# Patient Record
Sex: FEMALE
[De-identification: names, ages, dates, MRNs, and addresses within clinical notes are randomized per-mention and may not be internally consistent; named-entity substitution may affect disease eponyms.]

---

## 2021-03-19 ENCOUNTER — NURSE TRIAGE (OUTPATIENT)
Dept: OTHER | Facility: CLINIC | Age: 51
End: 2021-03-19

## 2021-03-19 NOTE — TELEPHONE ENCOUNTER
Reason for Disposition   Age > 36 and no obvious cause for pain, pain still present even when not moving the arm    Answer Assessment - Initial Assessment Questions  1. ONSET: \"When did the pain start? \"      Started 2.5 hours ago    2. LOCATION: \"Where is the pain located? \"      \"nerve pain\" that started in the hand and has now traveled from the wrist to the forearm and no to the armpit- left side     3. PAIN: \"How bad is the pain? \" (Scale 1-10; or mild, moderate, severe)    - MILD (1-3): doesn't interfere with normal activities    - MODERATE (4-7): interferes with normal activities (e.g., work or school) or awakens from sleep    - SEVERE (8-10): excruciating pain, unable to do any normal activities, unable to hold a cup of water      9/10    4. WORK OR EXERCISE: \"Has there been any recent work or exercise that involved this part of the body? \"      Denies    5. CAUSE: \"What do you think is causing the arm pain? \"      Unsure- possible arthritis     6. OTHER SYMPTOMS: \"Do you have any other symptoms? \" (e.g., neck pain, swelling, rash, fever, numbness, weakness)     Some numbness in the hand last night- tingling in the hand today     7. PREGNANCY: \"Is there any chance you are pregnant? \" \"When was your last menstrual period? \"      Denies    Protocols used: ARM PAIN-ADULT-[place]    Brief description of triage: Arm pain that has worsened in the last 2 hours. See assessment above. Triage indicates for patient to Go to ED now. Pt in agreement with this plan and states she does have a . Care advice provided, patient verbalizes understanding; denies any other questions or concerns; instructed to call back for any new or worsening symptoms. This triage is a result of a call to [address] [place] [address]. Please do not respond to the triage nurse through this encounter. Any subsequent communication should be directly with the patient.

## 2023-11-02 PROBLEM — F41.1 GENERALIZED ANXIETY DISORDER: Status: ACTIVE | Noted: 2023-08-03

## 2023-11-02 PROBLEM — J30.9 ALLERGIC RHINITIS: Status: ACTIVE | Noted: 2023-11-02

## 2023-11-02 PROBLEM — F32.A DEPRESSION: Status: ACTIVE | Noted: 2023-08-03

## 2023-11-02 PROBLEM — M79.606 PAIN IN LOWER LIMB: Status: ACTIVE | Noted: 2023-08-03

## 2023-11-02 PROBLEM — G47.10 HYPERSOMNIA: Status: ACTIVE | Noted: 2023-08-03

## 2023-11-02 PROBLEM — C64.9 RENAL CELL CARCINOMA (MULTI): Status: ACTIVE | Noted: 2023-11-02

## 2023-11-02 PROBLEM — R50.9 FEVER: Status: ACTIVE | Noted: 2023-11-02

## 2023-11-02 PROBLEM — F41.8 MIXED ANXIETY DEPRESSIVE DISORDER: Status: ACTIVE | Noted: 2023-06-15

## 2023-11-02 PROBLEM — F41.9 ANXIETY: Status: ACTIVE | Noted: 2023-11-02

## 2023-11-02 PROBLEM — R13.11 ORAL PHASE DYSPHAGIA: Status: ACTIVE | Noted: 2023-08-03

## 2023-11-02 PROBLEM — R13.10 DYSPHAGIA: Status: ACTIVE | Noted: 2023-08-03

## 2023-11-02 PROBLEM — R73.9 HYPERGLYCEMIA: Status: ACTIVE | Noted: 2023-08-03

## 2023-11-02 PROBLEM — J34.89 POSTERIOR RHINORRHEA: Status: ACTIVE | Noted: 2023-11-02

## 2023-11-02 PROBLEM — G56.02 CARPAL TUNNEL SYNDROME ON LEFT: Status: ACTIVE | Noted: 2023-08-03

## 2023-11-02 PROBLEM — R25.1 TREMOR: Status: ACTIVE | Noted: 2023-06-15

## 2023-11-02 PROBLEM — K21.9 GASTROESOPHAGEAL REFLUX DISEASE: Status: ACTIVE | Noted: 2023-05-23

## 2023-11-02 PROBLEM — Z85.9 HISTORY OF MALIGNANT NEOPLASM: Status: ACTIVE | Noted: 2023-11-02

## 2023-11-02 PROBLEM — Z90.5 HISTORY OF NEPHRECTOMY: Status: ACTIVE | Noted: 2023-11-02

## 2023-11-02 PROBLEM — E07.9 DISORDER OF THYROID: Status: ACTIVE | Noted: 2023-08-03

## 2023-11-02 PROBLEM — D64.9 ANEMIA: Status: ACTIVE | Noted: 2023-05-18

## 2023-11-02 PROBLEM — F31.70 BIPOLAR DISORDER IN FULL REMISSION (MULTI): Status: ACTIVE | Noted: 2023-06-15

## 2023-11-02 PROBLEM — Z86.79 H/O: HYPERTENSION: Status: ACTIVE | Noted: 2023-11-02

## 2023-11-02 PROBLEM — R40.0 DAYTIME SOMNOLENCE: Status: ACTIVE | Noted: 2023-08-03

## 2023-11-02 PROBLEM — G47.30 SLEEP APNEA: Status: ACTIVE | Noted: 2023-11-02

## 2023-11-02 PROBLEM — R05.8 PRODUCTIVE COUGH: Status: ACTIVE | Noted: 2023-11-02

## 2023-11-02 PROBLEM — E55.9 VITAMIN D DEFICIENCY: Status: ACTIVE | Noted: 2023-08-03

## 2023-11-02 PROBLEM — I83.90 VARICOSE VEINS OF LOWER EXTREMITY: Status: ACTIVE | Noted: 2023-08-03

## 2023-11-02 PROBLEM — R50.9 FEVER AND CHILLS: Status: ACTIVE | Noted: 2023-08-03

## 2023-11-02 PROBLEM — Z90.5 H/O RIGHT NEPHRECTOMY: Status: ACTIVE | Noted: 2023-11-02

## 2023-11-02 PROBLEM — J10.1 INFLUENZA B: Status: ACTIVE | Noted: 2023-11-02

## 2023-11-02 PROBLEM — F33.42 RECURRENT MAJOR DEPRESSION IN FULL REMISSION (CMS-HCC): Status: ACTIVE | Noted: 2023-09-14

## 2023-11-02 PROBLEM — R53.83 FATIGUE: Status: ACTIVE | Noted: 2023-06-15

## 2023-11-02 PROBLEM — M21.079 VALGUS DEFORMITY OF FOOT: Status: ACTIVE | Noted: 2023-08-03

## 2023-11-02 PROBLEM — K21.00 GASTROESOPHAGEAL REFLUX DISEASE WITH ESOPHAGITIS: Status: ACTIVE | Noted: 2023-11-02

## 2023-11-02 PROBLEM — K57.90 DIVERTICULAR DISEASE: Status: ACTIVE | Noted: 2023-08-03

## 2023-11-02 PROBLEM — F31.81 BIPOLAR II DISORDER (MULTI): Status: ACTIVE | Noted: 2023-08-03

## 2023-11-02 PROBLEM — D17.9 LIPOMA: Status: ACTIVE | Noted: 2023-08-03

## 2023-11-02 PROBLEM — G21.19: Status: ACTIVE | Noted: 2023-06-15

## 2023-11-02 PROBLEM — G47.33 OBSTRUCTIVE SLEEP APNEA SYNDROME: Status: ACTIVE | Noted: 2023-08-03

## 2023-11-02 PROBLEM — R19.7 DIARRHEA: Status: ACTIVE | Noted: 2023-11-02

## 2023-11-02 PROBLEM — G20.C PARKINSONISM (MULTI): Status: ACTIVE | Noted: 2023-11-02

## 2023-11-02 PROBLEM — E66.01 MORBID OBESITY (MULTI): Status: ACTIVE | Noted: 2023-11-02

## 2023-11-02 PROBLEM — G47.00 INSOMNIA: Status: ACTIVE | Noted: 2023-08-03

## 2023-11-02 PROBLEM — M72.2 PLANTAR FASCIITIS OF LEFT FOOT: Status: ACTIVE | Noted: 2023-08-03

## 2023-11-02 PROBLEM — E66.9 OBESITY (BMI 30-39.9): Status: ACTIVE | Noted: 2023-11-02

## 2023-11-02 PROBLEM — F98.8 ADD (ATTENTION DEFICIT DISORDER): Status: ACTIVE | Noted: 2023-08-03

## 2023-11-02 PROBLEM — J01.90 SINUSITIS, ACUTE: Status: ACTIVE | Noted: 2017-10-24

## 2023-11-02 PROBLEM — C64.1 MALIGNANT NEOPLASM OF RIGHT KIDNEY, EXCEPT RENAL PELVIS (MULTI): Status: ACTIVE | Noted: 2023-05-18

## 2023-11-02 PROBLEM — J39.9 DISORDER OF UPPER RESPIRATORY SYSTEM: Status: ACTIVE | Noted: 2023-11-02

## 2023-11-02 PROBLEM — E05.90 HYPERTHYROIDISM: Status: ACTIVE | Noted: 2023-08-03

## 2023-11-02 RX ORDER — SEMAGLUTIDE 1.34 MG/ML
0.5 INJECTION, SOLUTION SUBCUTANEOUS
COMMUNITY
Start: 2023-02-09

## 2023-11-02 RX ORDER — MULTIVITAMIN
1 TABLET ORAL DAILY
COMMUNITY

## 2023-11-02 RX ORDER — METFORMIN HYDROCHLORIDE 500 MG/1
TABLET ORAL
COMMUNITY
Start: 2022-04-01

## 2023-11-02 RX ORDER — ACETAMINOPHEN AND PHENYLEPHRINE HCL 325; 5 MG/1; MG/1
100 TABLET ORAL
COMMUNITY

## 2023-11-02 RX ORDER — FOLIC ACID 1 MG/1
1 TABLET ORAL DAILY
COMMUNITY
Start: 2023-08-01 | End: 2024-03-04 | Stop reason: WASHOUT

## 2023-11-02 RX ORDER — BUSPIRONE HYDROCHLORIDE 10 MG/1
20 TABLET ORAL 3 TIMES DAILY
COMMUNITY
Start: 2023-04-10

## 2023-11-02 RX ORDER — LOSARTAN POTASSIUM 50 MG/1
1 TABLET ORAL DAILY
COMMUNITY

## 2023-11-02 RX ORDER — ESCITALOPRAM OXALATE 20 MG/1
20 TABLET ORAL
COMMUNITY
End: 2024-03-04 | Stop reason: WASHOUT

## 2023-11-02 RX ORDER — HYDROCHLOROTHIAZIDE 25 MG/1
25 TABLET ORAL 3 TIMES DAILY
COMMUNITY
End: 2024-03-04 | Stop reason: WASHOUT

## 2023-11-02 RX ORDER — ERGOCALCIFEROL 1.25 MG/1
1 CAPSULE ORAL
COMMUNITY
End: 2024-03-04 | Stop reason: WASHOUT

## 2023-11-02 RX ORDER — ZOLPIDEM TARTRATE 1.75 MG/1
1.75 TABLET SUBLINGUAL AS NEEDED
COMMUNITY
End: 2024-03-04 | Stop reason: WASHOUT

## 2023-11-02 RX ORDER — ATORVASTATIN CALCIUM 20 MG/1
20 TABLET, FILM COATED ORAL
COMMUNITY

## 2023-11-02 RX ORDER — VENLAFAXINE HYDROCHLORIDE 150 MG/1
150 CAPSULE, EXTENDED RELEASE ORAL 2 TIMES DAILY
COMMUNITY

## 2023-11-02 RX ORDER — ATOMOXETINE 60 MG/1
1 CAPSULE ORAL DAILY
COMMUNITY
End: 2024-03-04 | Stop reason: DRUGHIGH

## 2023-11-02 RX ORDER — METHIMAZOLE 5 MG/1
5 TABLET ORAL 2 TIMES DAILY
COMMUNITY
End: 2024-03-04 | Stop reason: WASHOUT

## 2023-11-02 RX ORDER — LURASIDONE HYDROCHLORIDE 40 MG/1
1 TABLET, FILM COATED ORAL DAILY
COMMUNITY
End: 2024-03-04 | Stop reason: WASHOUT

## 2023-11-02 RX ORDER — BUPROPION HYDROCHLORIDE 150 MG/1
1 TABLET ORAL DAILY
COMMUNITY
Start: 2019-11-28 | End: 2024-03-04 | Stop reason: WASHOUT

## 2023-11-02 RX ORDER — CLONAZEPAM 0.5 MG/1
1 TABLET ORAL DAILY
COMMUNITY
End: 2024-03-04 | Stop reason: WASHOUT

## 2023-11-02 RX ORDER — OMEPRAZOLE 40 MG/1
1 CAPSULE, DELAYED RELEASE ORAL DAILY
COMMUNITY

## 2023-11-02 RX ORDER — LAMOTRIGINE 200 MG/1
200 TABLET ORAL
COMMUNITY
End: 2024-03-04 | Stop reason: WASHOUT

## 2023-11-03 ENCOUNTER — TELEMEDICINE CLINICAL SUPPORT (OUTPATIENT)
Dept: GENETICS | Facility: CLINIC | Age: 53
End: 2023-11-03
Payer: COMMERCIAL

## 2023-11-03 DIAGNOSIS — C64.9 RENAL CELL CARCINOMA, UNSPECIFIED LATERALITY (MULTI): ICD-10-CM

## 2023-11-03 PROCEDURE — 96040 HC GENETIC COUNSELING, EACH 30 MIN: CPT | Performed by: GENETIC COUNSELOR, MS

## 2023-11-03 PROCEDURE — 96040 PR MEDICAL GENETICS COUNSELING EACH 30 MINUTES: CPT | Performed by: GENETIC COUNSELOR, MS

## 2023-11-03 NOTE — PROGRESS NOTES
Subjective   Patient ID: Park Nicole is a 53 y.o. female who presents for genetic counseling due to a personal history of renal chromophobe carcinoma at  age 51, and a family history of lymphoma, colon cancer, and melanoma.    CANCER MEDICAL HISTORY  Personal history of cancer?  -10/18/21: presented to the ED with right sided flank pain, CT concerning for renal cell carcinoma, 9.4x6.2x6cm, underwent right radical nephrectomy, cholecystectomy, and BSO, pathology showed renal chromophobe carcinoma    PREVIOUS GENETIC TESTING?  -no    CANCER SCREENING HISTORY  Mammograms/MRIS?  - scattered areas of fibroglandular density, category 1 negative  - WNL    Breast biopsies?  -no    Ovarian cancer screening (ultrasound or CA-125)?  -10/18/21 transvaginal ultrasound showed right ovarian dermoid cyst    PAP smear?  - WNL  -all WNL in the past    Colonoscopy?  -18: diverticulosis  -23: 5 mm sessile polyp of the cecum, 6 mm sessile polyp in the transverse colon, both tubular adenomas    Endoscopy?  -18 esophagitis, gastritis    Hysterectomy?  -no    Oophorectomy?  -yes, 2018    Dermatology?  -lipomas on forehead and chest, removed    Other cancer screening?  -4/10/18 thyroid biopsy, two benign follicular nodules, hyperthyroid treated with radioactive iodine    Radiation?  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 The family history was significant for the following:    -Mother with melanoma diagnosed at age 67,  at age 77 in a  fire  -Father with non-Hodgkin lymphoma diagnosed at age 57, now 80  -Father's identical twin with colon cancer diagnosed at age 59, now 80  -Paternal uncle with Hodgkin lymphoma diagnosed at age 63,  at age 73 from his cancer  -Paternal grandfather with colon cancer diagnosed at age 70,  at 73 due to unrelated medical concerns  -Paternal cousin diagnosed with non-Hodgkin lymphoma at age 4, now 14    Consanguinity and Ashkenazi Caodaism ancestry were denied. The patient's maternal side is of French and Armenian descent, and the patient's paternal side is of Bohemian descent. The remainder of the family history was negative for intellectual disability, birth defects, miscarriages, stillbirths, blindness, deafness, kidney disease, heart disease, cancer, muscle disease, and blood disorders.    Assessment/Plan   Genetic Test Ordered: yes    Park Nicole is a 53 year old woman with a personal history of renal chromophobe carcinoma diagnosed at age 51, and a family history of lymphoma, colon cancer, and melanoma. Park meets NCCN criteria for testing of the renal carcinoma genes based on her diagnosis of a chromophobe carcinoma. Testing is medically necessary, as it could impact Park's medical management/treatment, and could estimate her risk to develop another primary cancer. She is interested in testing, which is recommended, and was ordered today via the 82-gene Multi-Cancers panel from [employer]. Our discussion is summarized below.    We reviewed genes, inherited forms of cancer, and the renal carcinoma predisposition genes. We discussed that most cancers are NOT due to an inherited genetic susceptibility. About 5-8% of all kidney cancers are inherited, however, only a few genes that increase the risk of kidney cancer have been identified. Most are associated with specific genetic syndromes with a specific type of kidney cancer. Some of these gene mutations also increase the lifetime risk for other cancers  (such as adrenal, ovarian, uterine, colorectal, sarcomas, brain, leukemia, gastric, thyroid, and prostate). It is likely that other undiscovered genes and background genetic factors contribute to the development of familial kidney cancer along with other non-genetic risk factors. Non-genetic risk factors include: smoking, male gender, -American ethnicity, age, obesity, high blood pressure, overuse of certain medications, exposure to cadmium, chronic kidney disease, and long-term dialysis.      Renal chromophobe cancer specifically has been associated with a genetic condition called Ooud-Rvsz-Ejcy syndrome (BHDS). This is a condition caused by mutations in the FLCN gene. Approximately 19-35% of affected individuals will develop renal cancer, with the most frequently observed type being chromphobe renal cancer. The most common symptoms of this condition include a variety of skin findings such as fibrofolliculomas, acrochordons, angiofibromas, oral papules, and cutaneous collagenomas. Other features include pulmonary cysts and an increased risk for pneumothoraxes.    We also discussed mutations in other genes that can confer an increased risk for renal cancer, along with other cancer types. Hereditary cancer syndrome gene mutations are typically inherited in an autosomal dominant fashion. This means that if an individual has a change in these genes, their siblings and children have a 50% chance of also having that gene change, and a 50% chance of not having that gene change.    Park was counseled about hereditary cancer susceptibility including cancer risks, options for increased screening and/or risk reduction, genetic testing, and the implications for other family members. We discussed genetic testing in detail, including the benefits, risks, and limitations of testing. We also reviewed the various possible test results, including positive, negative, and variant of unknown significance (VUS). We discussed  performing genetic testing in the context of a multi-gene panel test that looks at the FLCN gene, as well as the other genes associated with increasing the risk to develop renal cancer (32 gene Renal/Urinary Tract Cancers panel), or a larger panel that looks at renal cancer genes along with other hereditary cancer genes (84-gene Multi-Cancers panel). Park elected to proceed with [employer]'s 84-gene Multi-Cancers panel.    Park was scheduled for a follow-up consultation on 12-1-23 at 3:00 to discuss her testing results. At that time, we will make recommendations for both Keli and her family members in terms of cancer screening and/or cancer risk reduction options.    PLAN:  Park elected to undergo genetic testing via a panel test that analyzes 84 genes associated with renal and other cancer risks. Consent for testing was obtained verbally.  A buccal collection kit will be sent to her home.  Park will be contacted on 12-01-23 to discuss her results.  We remain available to Park or her family members at [phone] if any questions arise regarding information discussed at today's visit.    Naila Reddy MS [facility] – [place], [place]  Licensed Genetic Counselor  [facility]    Reviewed by Vidhi Cast MD  Clinical   [facility]    Total time spent on day of encounter: 61 minutes

## 2023-12-01 ENCOUNTER — TELEMEDICINE CLINICAL SUPPORT (OUTPATIENT)
Dept: GENETICS | Facility: CLINIC | Age: 53
End: 2023-12-01
Payer: COMMERCIAL

## 2023-12-01 DIAGNOSIS — C64.9 RENAL CELL CARCINOMA, UNSPECIFIED LATERALITY (MULTI): ICD-10-CM

## 2023-12-01 PROCEDURE — 96040 PR MEDICAL GENETICS COUNSELING EACH 30 MINUTES: CPT | Performed by: GENETIC COUNSELOR, MS

## 2023-12-04 NOTE — PROGRESS NOTES
Park Nicole is a 53 year old woman with a personal history of renal cancer and family history of colon cancer. She was initially seen in the [facility] on 11-03-23 for counseling and coordination of genetic testing. Based on Park's history of cancer, the 82 gene panel from [employer] were ordered. We reviewed the results of this testing, and our discussion is summarized below.    Park's genetic testing DID NOT show any known pathogenic (known harmful, cancer-causing) mutations in any of the genes which were examined. 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 The VUS is located at c.[id number] ([id number]) on one copy of her RECQL4 gene.     As discussed, a VUS is a change in the gene from the typical expected result that is not known if it is:  --- (a) harmful and associated with increased cancer risk, or  --- (b) benign and not associated with increased cancer risk.   Over time, as new knowledge is gained, a genetic testing laboratory may be able to reclassify the VUS as either benign or pathogenic. We will let Park know if an amended report is ever issued.    The RECQL4 gene is associated with an autosomal recessive Rothmund-Leos syndrome, RAPADILINO syndrome, and Baller-Gerold syndrome which means that Park needs a change in BOTH copies of the gene in order to be affected with any of those syndromes. Park was only found to have one VUS on one copy of the RECQL4 gene. In the future, if this variant is reclassified as pathogenic (disease-causing), Park would only be a CARRIER of the condition, meaning she would not be affected with these syndromes, but could have a child with the condition if Park's partner also carried a change in one of the RECQL4 genes.    Overall, a genetic cause for Park's personal [and family] history of cancer has not been identified. Negative  genetic testing results can be explained by several possibilities:  --- It is estimated that a small percentage of gene mutations are not identified by current testing technology. Park's negative testing results make a hereditary cancer syndrome less likely, but they do not rule it out completely.  --- There could be other genes that may increase the risk for cancer for which there is no testing available at this time.   --- Park's personal and family history of cancer is not due to an inherited risk factor, and instead is sporadic or due to chance.    Additionally, given her negative results, Park was not found to have a pathogenic (disease-causing) genetic alteration in any of the tested genes that she could have passed down to her children. Given this, genetic testing is not needed for Park's children at this time, unless otherwise indicated based on their father's family history of cancer.     While it is reassuring that no mutations were detected in the tested genes, Park's relatives remain at an increased risk to develop renal cancer based on the family history. Previous studies have estimated an overall two-fold increase in RCC risk in those with a family history of renal cell carcinoma; the general population's risk to develop renal cell carcinoma is 1.6%  (PMID: 85097001). This risk was nearly four-fold when the affected relative was a sibling (PMID: 61832442). Screening such as computed tomography (CT) scans or renal ultrasounds are sometimes used to search for early-stage kidney cancer; these screening methods can be discussed with Park's relatives' primary care physicians.     Park has a paternal uncle and paternal grandfather with colon cancer. Genetic testing did not identify any mutations associated with an increased risk for colon cancer. Per NCCN guidelines, current risk estimates for a family history of colon cancer in only second and third degree relatives are not sufficiently  elevated to recommend increased screening. Some combinations of affected first-, second- and third-degree relatives may increase risk sufficiently to alter screening guidelines.  Individuals with a second degree relative with colon cancer should have colonoscopies beginning at age 45-50 (general population is also recommended to start colonoscopies around this age) and repeat every 10 years or if positive, repeat per colonoscopy findings.    Lastly, it is important for Park to follow all other age-related cancer screenings per her primary care providers' recommendations, such as Pap smears, mammograms, etc.    Plan:  - A copy of Park's genetic test report will be mailed to her.  - Our understanding of genetic contribution to cancer diagnoses is always evolving, so there may be additional testing recommended in the future.  - She can contact us in 3-4 years to determine if there have been any changes since our discussion today and to see if the VUS in the RECQL4 gene has been reclassified.  - If Park has any questions following her appointment today, please call me at [phone].    Naila Reddy MS [facility] – [place]  Licensed Genetic Counselor  [facility]    Reviewed by:  Dr. Vidhi Cast  Clinical   [facility]  [phone]    Total time spent on day of encounter: 31 minutes

## 2024-02-15 ENCOUNTER — APPOINTMENT (OUTPATIENT)
Dept: PRIMARY CARE | Facility: CLINIC | Age: 54
End: 2024-02-15
Payer: COMMERCIAL

## 2024-03-03 PROBLEM — R50.9 FEVER: Status: RESOLVED | Noted: 2023-11-02 | Resolved: 2024-03-03

## 2024-03-03 PROBLEM — E07.9 DISORDER OF THYROID: Status: RESOLVED | Noted: 2023-08-03 | Resolved: 2024-03-03

## 2024-03-03 PROBLEM — F41.9 ANXIETY: Status: RESOLVED | Noted: 2023-11-02 | Resolved: 2024-03-03

## 2024-03-03 PROBLEM — K21.00 GASTROESOPHAGEAL REFLUX DISEASE WITH ESOPHAGITIS: Status: RESOLVED | Noted: 2023-11-02 | Resolved: 2024-03-03

## 2024-03-03 PROBLEM — Z85.9 HISTORY OF MALIGNANT NEOPLASM: Status: RESOLVED | Noted: 2023-11-02 | Resolved: 2024-03-03

## 2024-03-03 PROBLEM — G47.33 OBSTRUCTIVE SLEEP APNEA SYNDROME: Status: RESOLVED | Noted: 2023-08-03 | Resolved: 2024-03-03

## 2024-03-03 PROBLEM — R13.11 ORAL PHASE DYSPHAGIA: Status: RESOLVED | Noted: 2023-08-03 | Resolved: 2024-03-03

## 2024-03-03 PROBLEM — Z90.5 HISTORY OF NEPHRECTOMY: Status: RESOLVED | Noted: 2023-11-02 | Resolved: 2024-03-03

## 2024-03-03 PROBLEM — D17.9 LIPOMA: Status: RESOLVED | Noted: 2023-08-03 | Resolved: 2024-03-03

## 2024-03-03 PROBLEM — F31.70 BIPOLAR DISORDER IN FULL REMISSION (MULTI): Status: RESOLVED | Noted: 2023-06-15 | Resolved: 2024-03-03

## 2024-03-03 PROBLEM — J10.1 INFLUENZA B: Status: RESOLVED | Noted: 2023-11-02 | Resolved: 2024-03-03

## 2024-03-03 PROBLEM — G47.10 HYPERSOMNIA: Status: RESOLVED | Noted: 2023-08-03 | Resolved: 2024-03-03

## 2024-03-03 PROBLEM — C64.9 RENAL CELL CARCINOMA (MULTI): Status: RESOLVED | Noted: 2023-11-02 | Resolved: 2024-03-03

## 2024-03-03 PROBLEM — J01.90 SINUSITIS, ACUTE: Status: RESOLVED | Noted: 2017-10-24 | Resolved: 2024-03-03

## 2024-03-03 PROBLEM — F41.8 MIXED ANXIETY DEPRESSIVE DISORDER: Status: RESOLVED | Noted: 2023-06-15 | Resolved: 2024-03-03

## 2024-03-03 PROBLEM — J34.89 POSTERIOR RHINORRHEA: Status: RESOLVED | Noted: 2023-11-02 | Resolved: 2024-03-03

## 2024-03-03 PROBLEM — J39.9 DISORDER OF UPPER RESPIRATORY SYSTEM: Status: RESOLVED | Noted: 2023-11-02 | Resolved: 2024-03-03

## 2024-03-03 PROBLEM — R50.9 FEVER AND CHILLS: Status: RESOLVED | Noted: 2023-08-03 | Resolved: 2024-03-03

## 2024-03-04 ENCOUNTER — LAB (OUTPATIENT)
Dept: LAB | Facility: LAB | Age: 54
End: 2024-03-04
Payer: COMMERCIAL

## 2024-03-04 ENCOUNTER — OFFICE VISIT (OUTPATIENT)
Dept: PRIMARY CARE | Facility: CLINIC | Age: 54
End: 2024-03-04
Payer: COMMERCIAL

## 2024-03-04 VITALS
OXYGEN SATURATION: 97 % | TEMPERATURE: 97.8 F | HEIGHT: 66 IN | BODY MASS INDEX: 38.25 KG/M2 | SYSTOLIC BLOOD PRESSURE: 120 MMHG | DIASTOLIC BLOOD PRESSURE: 80 MMHG | HEART RATE: 81 BPM | WEIGHT: 238 LBS

## 2024-03-04 DIAGNOSIS — K14.6 TONGUE BURNING SENSATION: ICD-10-CM

## 2024-03-04 DIAGNOSIS — R25.1 TREMOR: Primary | ICD-10-CM

## 2024-03-04 DIAGNOSIS — R79.0 LOW FERRITIN: Primary | ICD-10-CM

## 2024-03-04 DIAGNOSIS — J01.10 ACUTE NON-RECURRENT FRONTAL SINUSITIS: ICD-10-CM

## 2024-03-04 PROBLEM — G47.00 INSOMNIA: Status: RESOLVED | Noted: 2023-08-03 | Resolved: 2024-03-04

## 2024-03-04 PROBLEM — R40.0 DAYTIME SOMNOLENCE: Status: RESOLVED | Noted: 2023-08-03 | Resolved: 2024-03-04

## 2024-03-04 PROBLEM — F33.42 RECURRENT MAJOR DEPRESSION IN FULL REMISSION (CMS-HCC): Status: RESOLVED | Noted: 2023-09-14 | Resolved: 2024-03-04

## 2024-03-04 PROBLEM — Z86.79 H/O: HYPERTENSION: Status: RESOLVED | Noted: 2023-11-02 | Resolved: 2024-03-04

## 2024-03-04 PROBLEM — K21.9 GASTROESOPHAGEAL REFLUX DISEASE: Status: RESOLVED | Noted: 2023-05-23 | Resolved: 2024-03-04

## 2024-03-04 PROBLEM — R05.8 PRODUCTIVE COUGH: Status: RESOLVED | Noted: 2023-11-02 | Resolved: 2024-03-04

## 2024-03-04 PROBLEM — G20.C PARKINSONISM (MULTI): Status: RESOLVED | Noted: 2023-11-02 | Resolved: 2024-03-04

## 2024-03-04 PROBLEM — M72.2 PLANTAR FASCIITIS OF LEFT FOOT: Status: RESOLVED | Noted: 2023-08-03 | Resolved: 2024-03-04

## 2024-03-04 PROBLEM — F31.81 BIPOLAR II DISORDER (MULTI): Status: RESOLVED | Noted: 2023-08-03 | Resolved: 2024-03-04

## 2024-03-04 LAB
ERYTHROCYTE [DISTWIDTH] IN BLOOD BY AUTOMATED COUNT: 14 % (ref 11.5–14.5)
FERRITIN SERPL-MCNC: 17 NG/ML (ref 8–150)
FOLATE SERPL-MCNC: >24 NG/ML
HCT VFR BLD AUTO: 41.7 % (ref 36–46)
HGB BLD-MCNC: 13.3 G/DL (ref 12–16)
IRON SATN MFR SERPL: 10 % (ref 25–45)
IRON SERPL-MCNC: 41 UG/DL (ref 35–150)
MCH RBC QN AUTO: 29.2 PG (ref 26–34)
MCHC RBC AUTO-ENTMCNC: 31.9 G/DL (ref 32–36)
MCV RBC AUTO: 92 FL (ref 80–100)
NRBC BLD-RTO: 0 /100 WBCS (ref 0–0)
PLATELET # BLD AUTO: 288 X10*3/UL (ref 150–450)
RBC # BLD AUTO: 4.55 X10*6/UL (ref 4–5.2)
TIBC SERPL-MCNC: 410 UG/DL (ref 240–445)
UIBC SERPL-MCNC: 369 UG/DL (ref 110–370)
VIT B12 SERPL-MCNC: 771 PG/ML (ref 211–911)
WBC # BLD AUTO: 7 X10*3/UL (ref 4.4–11.3)

## 2024-03-04 PROCEDURE — 82746 ASSAY OF FOLIC ACID SERUM: CPT

## 2024-03-04 PROCEDURE — 82728 ASSAY OF FERRITIN: CPT

## 2024-03-04 PROCEDURE — 82607 VITAMIN B-12: CPT

## 2024-03-04 PROCEDURE — 1036F TOBACCO NON-USER: CPT | Performed by: INTERNAL MEDICINE

## 2024-03-04 PROCEDURE — 85027 COMPLETE CBC AUTOMATED: CPT

## 2024-03-04 PROCEDURE — 83550 IRON BINDING TEST: CPT

## 2024-03-04 PROCEDURE — 36415 COLL VENOUS BLD VENIPUNCTURE: CPT

## 2024-03-04 PROCEDURE — 99214 OFFICE O/P EST MOD 30 MIN: CPT | Performed by: INTERNAL MEDICINE

## 2024-03-04 PROCEDURE — 83540 ASSAY OF IRON: CPT

## 2024-03-04 RX ORDER — HYDROXYZINE HYDROCHLORIDE 25 MG/1
25 TABLET, FILM COATED ORAL 2 TIMES DAILY PRN
COMMUNITY
Start: 2024-02-24

## 2024-03-04 RX ORDER — FLUTICASONE PROPIONATE 50 MCG
1 SPRAY, SUSPENSION (ML) NASAL DAILY
Qty: 16 G | Refills: 11 | Status: SHIPPED | OUTPATIENT
Start: 2024-03-04 | End: 2025-03-04

## 2024-03-04 RX ORDER — AMANTADINE HYDROCHLORIDE 100 MG/1
100 TABLET ORAL 2 TIMES DAILY
COMMUNITY
Start: 2024-02-21

## 2024-03-04 RX ORDER — GABAPENTIN 400 MG/1
400 CAPSULE ORAL DAILY
COMMUNITY
Start: 2024-02-25

## 2024-03-04 RX ORDER — ATOMOXETINE 40 MG/1
40 CAPSULE ORAL DAILY
COMMUNITY
Start: 2024-03-01

## 2024-03-04 RX ORDER — AMOXICILLIN 500 MG/1
TABLET, FILM COATED ORAL
Qty: 14 TABLET | Refills: 0 | Status: SHIPPED | OUTPATIENT
Start: 2024-03-04

## 2024-03-04 RX ORDER — PROPRANOLOL HYDROCHLORIDE 10 MG/1
10 TABLET ORAL 2 TIMES DAILY
COMMUNITY
Start: 2024-02-05

## 2024-03-04 ASSESSMENT — PATIENT HEALTH QUESTIONNAIRE - PHQ9
SUM OF ALL RESPONSES TO PHQ9 QUESTIONS 1 & 2: 0
1. LITTLE INTEREST OR PLEASURE IN DOING THINGS: NOT AT ALL
2. FEELING DOWN, DEPRESSED OR HOPELESS: NOT AT ALL

## 2024-03-04 ASSESSMENT — PAIN SCALES - GENERAL: PAINLEVEL: 6

## 2024-03-04 NOTE — PROGRESS NOTES
"   Chief Complaint   Patient presents with    tongue discomfort     Pt here for pain to tongue.  States \"There are also little grooves in my tongue.\"  Onset 2 months.       53 year old woman  Last seen 02/2022  Since then saw partners  Most recent visit 05/2023 Dr Bustamante.   Also since last visit had genetic testing.     Congestion end of December. Since then had tongue pain  Better but still present.  Noticed bumps in the last 1-2 weeks.  Salty food irritate it. Previously was any food  No thrush. Did not notice any ulcers  No GERD.  Trying to walk every other day 30 minutes on treadmill    Past medical history  Euthyroid, previously hyperthyroid. Treated with radioactive iodine November 2018 Dr. Bautista  Diverticulosis, colonoscopy August 2018 Dr. Parkinson  EGD August 2018 esophagitis, gastritis  Sleep apnea CPAP 8 per sleep study. Does not recall settings. Dr. Reyes  Vitamin D deficiency  Depression anxiety nurse practitioner [facility] behavioral [facility] counseling [place]  Varicose veins  Renal carcinoma, right status post nephrectomy. Dr. Rea October 2021, Dr Ocampo  Laparoscopic cholecystectomy October 2021 Dr Vargas  BSO 10/2021 Dr ROULA Tate.     Social history. Works as a teacher. Non-smoker. . 2 sons     Exam  Blood pressure 120/80, pulse 81, temperature 36.6 °C (97.8 °F), height 1.676 m (5' 6\"), weight 108 kg (238 lb), SpO2 97 %.  Body mass index is 38.41 kg/m².  +frontal sinus tenderness  Ears: TM intact. No erythema  Throat: no thrush, no ulcers. +ridges anterior tongue  Lymph nodes: no cervical/clavicular adenopathy  CV: RRR S1 S2 normal. No murmur  Lungs: CTA without wrr. Breath sounds symmetric.   Neuro: speech intact        1/2022 tsh, cmp, lipid, t4, D, cbc.      November 2021 TSH, T4, CBC, BMP  TSH 0.85  Sodium 139 potassium 4.1 creatinine 1.2 GFR 46  White blood cell count 5.5 hemoglobin low 9.5 hematocrit 28.5 platelet 295     ASSESSMENT AND PLAN:   1. Tremor        Sees specialist for " this and is on medication    2. Tongue burning sensation  If labs negative advised to see ent  - Ferritin; Future  - Iron and TIBC; Future  - Vitamin B12; Future  - Folate; Future  - CBC; Future    3. Acute non-recurrent frontal sinusitis  - amoxicillin (Amoxil) 500 mg tablet; 1 po bid  Dispense: 14 tablet; Refill: 0  - fluticasone (Flonase) 50 mcg/actuation nasal spray; Administer 1 spray into each nostril once daily. Shake gently. Before first use, prime pump. After use, clean tip and replace cap.  Dispense: 16 g; Refill: 11       Mammogram 12/2021 negative.  Pt reports had this done by hem/onc and is up to date.   DEXA per Dr. Bautista  Pap 2/2022 due 2027  Colonoscopy August 2018 Dr Parkinson repeat 5-10 years.      Current Outpatient Medications on File Prior to Visit   Medication Sig Dispense Refill    amantadine (Symmetrel) 100 mg tablet 1 tablet (100 mg) 2 times a day.      atomoxetine (Strattera) 40 mg capsule Take 1 capsule (40 mg) by mouth once daily.      atorvastatin (Lipitor) 20 mg tablet Take 1 tablet (20 mg) by mouth once daily.      biotin 10,000 mcg capsule Take 10 capsules (100 mg) by mouth once daily.      busPIRone (Buspar) 10 mg tablet Take 2 tablets (20 mg) by mouth 3 times a day.      gabapentin (Neurontin) 400 mg capsule Take 1 capsule (400 mg) by mouth once daily.      hydrOXYzine HCL (Atarax) 25 mg tablet Take 1 tablet (25 mg) by mouth 2 times a day as needed for anxiety.      losartan (Cozaar) 50 mg tablet Take 1 tablet (50 mg) by mouth once daily.      metFORMIN (Glucophage) 500 mg tablet Take by mouth. TAKE ONE TABLET IN THE AM AND THREE TABLETS IN THE PM      multivitamin (Daily Multi-Vitamin) tablet Take 1 tablet by mouth once daily.      omeprazole (PriLOSEC) 40 mg DR capsule Take 1 capsule (40 mg) by mouth once daily.      propranolol (Inderal) 10 mg tablet Take 1 tablet (10 mg) by mouth 2 times a day.      semaglutide (Ozempic) 0.25 mg or 0.5 mg(2 mg/1.5 mL) pen injector Inject 0.5 mg  under the skin 1 (one) time per week.      venlafaxine XR (Effexor-XR) 150 mg 24 hr capsule Take 1 capsule (150 mg) by mouth twice a day.      [DISCONTINUED] atomoxetine (Strattera) 60 mg capsule Take 1 capsule (60 mg) by mouth once daily.      [DISCONTINUED] buPROPion XL (Wellbutrin XL) 150 mg 24 hr tablet Take 1 tablet (150 mg) by mouth once daily.      [DISCONTINUED] clonazePAM (KlonoPIN) 0.5 mg tablet Take 1 tablet (0.5 mg) by mouth once daily.      [DISCONTINUED] ergocalciferol (Vitamin D-2) 1.25 MG (71256 UT) capsule Take 1 capsule (1,250 mcg) by mouth 1 (one) time per week.      [DISCONTINUED] escitalopram (Lexapro) 20 mg tablet Take 1 tablet (20 mg) by mouth once daily.      [DISCONTINUED] folic acid (Folvite) 1 mg tablet Take 1 tablet (1 mg) by mouth once daily.      [DISCONTINUED] hydroCHLOROthiazide (HYDRODiuril) 25 mg tablet Take 1 tablet (25 mg) by mouth 3 times a day.      [DISCONTINUED] lamoTRIgine (LaMICtal) 200 mg tablet Take 1 tablet (200 mg) by mouth once daily.      [DISCONTINUED] lurasidone (Latuda) 40 mg tablet Take 1 tablet (40 mg) by mouth once daily.      [DISCONTINUED] methIMAzole (Tapazole) 5 mg tablet Take 1 tablet (5 mg) by mouth twice a day.      [DISCONTINUED] vortioxetine (Trintellix) 20 mg tablet tablet Take 1 tablet (20 mg) by mouth once daily.      [DISCONTINUED] zolpidem 1.75 mg tablet, sublingual Place 1.75 mg under the tongue if needed.       No current facility-administered medications on file prior to visit.

## 2024-05-10 ENCOUNTER — TELEPHONE (OUTPATIENT)
Dept: PRIMARY CARE | Facility: CLINIC | Age: 54
End: 2024-05-10
Payer: COMMERCIAL

## 2024-05-10 NOTE — TELEPHONE ENCOUNTER
"Pt left message \"The past two nights I have had panic attacks with lingering symptoms the day after on an off.  I cannot get in to see my psychologist until Wednesday and they suggest that I go to the ER or call you.\"  This writer called pt back to discuss lingering symptoms.  Pt became tearful and states \"I ended up going to the ER because I don't want to deal with this again tonight.  I was having on and off chest pain, chest tightness and sweating.  I didn't know what to do, no one was helping me.\"  Encouraged pt that it was best that she went to the ER as she did.  Pt at [facility] ER at time of this note.  "

## 2024-05-10 NOTE — TELEPHONE ENCOUNTER
"Spoke with pt.  States \"I am feeling a little bit better.  The ER told me to take Klonopin 0.5 MG twice a day for the next three days and they got me an appointment with my psychiatrist for next Wednesday.\"  "

## 2024-07-25 LAB
NON-UH HIE A/G RATIO: 1.1
NON-UH HIE ALB: 3.7 G/DL (ref 3.4–5)
NON-UH HIE ALK PHOS: 115 UNIT/L (ref 45–117)
NON-UH HIE BILIRUBIN, TOTAL: 0.8 MG/DL (ref 0.3–1.2)
NON-UH HIE BUN/CREAT RATIO: 15.8
NON-UH HIE BUN: 19 MG/DL (ref 9–23)
NON-UH HIE CALCIUM, IONIZED: 1.33 MMOL/L (ref 1.12–1.32)
NON-UH HIE CALCIUM: 10.4 MG/DL (ref 8.7–10.4)
NON-UH HIE CALCULATED OSMOLALITY: 283 MOSM/KG (ref 275–295)
NON-UH HIE CHLORIDE: 104 MMOL/L (ref 98–107)
NON-UH HIE CO2, VENOUS: 31 MMOL/L (ref 20–31)
NON-UH HIE CREATININE, URINE MG/DL: 69.8 MG/DL
NON-UH HIE CREATININE: 1.2 MG/DL (ref 0.5–0.8)
NON-UH HIE GFR AA: 57
NON-UH HIE GLOBULIN: 3.3 G/DL
NON-UH HIE GLOMERULAR FILTRATION RATE: 47 ML/MIN/1.73M?
NON-UH HIE GLUCOSE: 95 MG/DL (ref 74–106)
NON-UH HIE GOT: 20 UNIT/L (ref 15–37)
NON-UH HIE GPT: 24 UNIT/L (ref 10–49)
NON-UH HIE HGB A1C: 5.3 %
NON-UH HIE I-PTH: 115 PG/ML (ref 18.4–80.1)
NON-UH HIE K: 4.4 MMOL/L (ref 3.5–5.1)
NON-UH HIE MICROALBUMIN, URINE MG/L: <3 MG/L
NON-UH HIE MICROALBUMIN/CREATININE RATIO: <4 MG MALB/GM CREAT (ref 0–30)
NON-UH HIE NA: 141 MMOL/L (ref 135–145)
NON-UH HIE TOTAL PROTEIN: 7 G/DL (ref 5.7–8.2)
NON-UH HIE TSH: 1.49 UIU/ML (ref 0.55–4.78)
NON-UH HIE VIT D 25: 43 NG/ML

## 2024-08-01 DIAGNOSIS — Z12.31 ENCOUNTER FOR SCREENING MAMMOGRAM FOR BREAST CANCER: ICD-10-CM

## 2024-08-01 NOTE — PROGRESS NOTES
She has this done per her cancer doctor at indicated she was up to date. Can we obtain report so up to date in system and meets criteria for metric

## 2024-08-08 ENCOUNTER — APPOINTMENT (OUTPATIENT)
Dept: RADIOLOGY | Facility: CLINIC | Age: 54
End: 2024-08-08
Payer: COMMERCIAL

## 2024-08-26 ENCOUNTER — APPOINTMENT (OUTPATIENT)
Dept: RADIOLOGY | Facility: CLINIC | Age: 54
End: 2024-08-26
Payer: COMMERCIAL

## 2024-09-23 ENCOUNTER — APPOINTMENT (OUTPATIENT)
Dept: PRIMARY CARE | Facility: CLINIC | Age: 54
End: 2024-09-23
Payer: COMMERCIAL

## 2024-09-23 VITALS
SYSTOLIC BLOOD PRESSURE: 118 MMHG | HEIGHT: 66 IN | HEART RATE: 68 BPM | WEIGHT: 229.4 LBS | DIASTOLIC BLOOD PRESSURE: 70 MMHG | BODY MASS INDEX: 36.87 KG/M2 | OXYGEN SATURATION: 96 % | TEMPERATURE: 97.8 F

## 2024-09-23 DIAGNOSIS — R10.32 LEFT LOWER QUADRANT ABDOMINAL PAIN: ICD-10-CM

## 2024-09-23 DIAGNOSIS — Z90.5 H/O RIGHT NEPHRECTOMY: ICD-10-CM

## 2024-09-23 DIAGNOSIS — C64.1 MALIGNANT NEOPLASM OF RIGHT KIDNEY, EXCEPT RENAL PELVIS: Primary | ICD-10-CM

## 2024-09-23 PROCEDURE — 1036F TOBACCO NON-USER: CPT | Performed by: INTERNAL MEDICINE

## 2024-09-23 PROCEDURE — 99214 OFFICE O/P EST MOD 30 MIN: CPT | Performed by: INTERNAL MEDICINE

## 2024-09-23 PROCEDURE — 3008F BODY MASS INDEX DOCD: CPT | Performed by: INTERNAL MEDICINE

## 2024-09-23 RX ORDER — SEMAGLUTIDE 1.34 MG/ML
1 INJECTION, SOLUTION SUBCUTANEOUS WEEKLY
COMMUNITY
Start: 2024-06-08

## 2024-09-23 RX ORDER — FERROUS SULFATE 325(65) MG
325 TABLET ORAL 3 TIMES WEEKLY
COMMUNITY

## 2024-09-23 RX ORDER — PROPRANOLOL HYDROCHLORIDE 20 MG/1
20 TABLET ORAL 2 TIMES DAILY
COMMUNITY
Start: 2024-05-17

## 2024-09-23 RX ORDER — ATOMOXETINE 60 MG/1
60 CAPSULE ORAL DAILY
COMMUNITY
Start: 2024-09-15

## 2024-09-23 ASSESSMENT — PAIN SCALES - GENERAL: PAINLEVEL: 0-NO PAIN

## 2024-09-23 ASSESSMENT — PATIENT HEALTH QUESTIONNAIRE - PHQ9
2. FEELING DOWN, DEPRESSED OR HOPELESS: SEVERAL DAYS
SUM OF ALL RESPONSES TO PHQ9 QUESTIONS 1 & 2: 2
10. IF YOU CHECKED OFF ANY PROBLEMS, HOW DIFFICULT HAVE THESE PROBLEMS MADE IT FOR YOU TO DO YOUR WORK, TAKE CARE OF THINGS AT HOME, OR GET ALONG WITH OTHER PEOPLE: SOMEWHAT DIFFICULT
1. LITTLE INTEREST OR PLEASURE IN DOING THINGS: SEVERAL DAYS

## 2024-09-23 NOTE — PROGRESS NOTES
"Chief Complaint   Patient presents with    Pain     Pt here for c/o pain to left side of torso, states \"The pain started last Thursday and I had it on Friday. Too.  The pain felt like the pain I had on my right side when I was diagnosed with cancer.\"     54 year old woman  Last seen 003/2024. Left sided abdominal discomfort. Urination and bowel movements are ok.   Had renal cancer right side. This pain reminded her of what she felt prior to diagnosis of right renal cancer.        BM ok. No dysuria or hematuria.   Dr Ocampo's office recommended trying heat.         Undergoing work up by Dr Bautista for hyperparathyroidism.       Past medical history  Euthyroid, previously hyperthyroid. Treated with radioactive iodine November 2018 Dr. Bautista  Diverticulosis, colonoscopy August 2018 Dr. Parkinson  EGD August 2018 esophagitis, gastritis  Sleep apnea CPAP 8 per sleep study. Does not recall settings. Dr. Reyes  Vitamin D deficiency  Depression anxiety nurse practitioner [facility] [place]  Varicose veins  Renal carcinoma, right status post nephrectomy. Dr. Rea October 2021, Dr Ocampo  Laparoscopic cholecystectomy October 2021 Dr Vargas  BSO 10/2021 Dr ROULA Tate.     Social history. Works as a teacher. Non-smoker. . 2 sons     Exam  Blood pressure 118/70, pulse 68, temperature 36.6 °C (97.8 °F), height 1.676 m (5' 6\"), weight 104 kg (229 lb 6.4 oz), SpO2 96%.  Body mass index is 37.03 kg/m².  Vital reviewed  CV: RRR S1 S2 normal. No murmur.   Lungs: CTA without wrr. Breath sounds symmetric  Abdomen: normoactive. Soft. No mass.   Extremities: no pretibial edema  Neuro: speech intact.        07/2024 MA, PTH, CMP, D, TSH, A1c, calcium     1/2022 tsh, cmp, lipid, t4, D, cbc.      November 2021 TSH, T4, CBC, BMP  TSH 0.85  Sodium 139 potassium 4.1 creatinine 1.2 GFR 46  White blood cell count 5.5 hemoglobin low 9.5 hematocrit 28.5 platelet 295     ASSESSMENT AND PLAN:  1. Malignant " neoplasm of right kidney, except renal pelvis (Multi)  - CT abdomen pelvis w IV contrast; Future  - Creatinine, Serum; Future    2. H/O right nephrectomy  - CT abdomen pelvis w IV contrast; Future  - Creatinine, Serum; Future    3. Left lower quadrant abdominal pain  - CT abdomen pelvis w IV contrast; Future  - Creatinine, Serum; Future    Discussed case with Dr Ocampo on 9/20/2024.  Patient had CT chest/abd/pelvis May 2024 that was negative. He indicated this type of cancer typically does not return to the same location but if returns may go to the lung. Also would not expect this to be source of pain given recent negative imaging. However, given history of cancer does need repeat imaging of ct a/p now.      Mammogram  06/2023   DEXA per Dr. Bautista  Pap 2/2022 due 2027  Colonoscopy August 2018 Dr Parkinson repeat 5-10 years.      Current Outpatient Medications on File Prior to Visit   Medication Sig Dispense Refill    amantadine (Symmetrel) 100 mg tablet 1 tablet (100 mg) 2 times a day.      atomoxetine (Strattera) 60 mg capsule Take 1 capsule (60 mg) by mouth once daily.      atorvastatin (Lipitor) 20 mg tablet Take 1 tablet (20 mg) by mouth once daily.      biotin 10,000 mcg capsule Take 1 capsule (10 mg) by mouth once daily.      busPIRone (Buspar) 10 mg tablet Take 2 tablets (20 mg) by mouth 3 times a day.      ferrous sulfate, 325 mg ferrous sulfate, tablet Take 1 tablet (325 mg) by mouth 3 times a week.      fluticasone (Flonase) 50 mcg/actuation nasal spray Administer 1 spray into each nostril once daily. Shake gently. Before first use, prime pump. After use, clean tip and replace cap. 16 g 11    gabapentin (Neurontin) 400 mg capsule Take 1 capsule (400 mg) by mouth once daily.      hydrOXYzine HCL (Atarax) 25 mg tablet Take 1 tablet (25 mg) by mouth 2 times a day as needed for anxiety.      losartan (Cozaar) 50 mg tablet Take 1 tablet (50 mg) by mouth once daily.      metFORMIN (Glucophage) 500 mg tablet Take  by mouth. TAKE ONE TABLET IN THE AM AND THREE TABLETS IN THE PM      multivitamin (Daily Multi-Vitamin) tablet Take 1 tablet by mouth once daily.      omeprazole (PriLOSEC) 40 mg DR capsule Take 2 capsules (80 mg) by mouth once daily.      Ozempic 1 mg/dose (4 mg/3 mL) pen injector Inject 1 mg under the skin 1 (one) time per week.      propranolol (Inderal) 20 mg tablet Take 1 tablet (20 mg) by mouth 2 times a day.      venlafaxine XR (Effexor-XR) 150 mg 24 hr capsule Take 1 capsule (150 mg) by mouth twice a day.      [DISCONTINUED] amoxicillin (Amoxil) 500 mg tablet 1 po bid (Patient not taking: Reported on 9/23/2024) 14 tablet 0    [DISCONTINUED] atomoxetine (Strattera) 40 mg capsule Take 1 capsule (40 mg) by mouth once daily.      [DISCONTINUED] propranolol (Inderal) 10 mg tablet Take 1 tablet (10 mg) by mouth 2 times a day.      [DISCONTINUED] semaglutide (Ozempic) 0.25 mg or 0.5 mg(2 mg/1.5 mL) pen injector Inject 0.5 mg under the skin 1 (one) time per week.       No current facility-administered medications on file prior to visit.

## 2024-10-08 ENCOUNTER — HOSPITAL ENCOUNTER (OUTPATIENT)
Dept: RADIOLOGY | Facility: EXTERNAL LOCATION | Age: 54
Discharge: HOME | End: 2024-10-08

## 2024-10-14 ENCOUNTER — HOSPITAL ENCOUNTER (OUTPATIENT)
Dept: RADIOLOGY | Facility: HOSPITAL | Age: 54
Discharge: HOME | End: 2024-10-14
Payer: COMMERCIAL

## 2024-10-14 DIAGNOSIS — C64.1 MALIGNANT NEOPLASM OF RIGHT KIDNEY, EXCEPT RENAL PELVIS: ICD-10-CM

## 2024-10-14 DIAGNOSIS — Z90.5 H/O RIGHT NEPHRECTOMY: ICD-10-CM

## 2024-10-14 DIAGNOSIS — R10.32 LEFT LOWER QUADRANT ABDOMINAL PAIN: ICD-10-CM

## 2024-10-14 LAB
CREAT SERPL-MCNC: 1.1 MG/DL (ref 0.6–1.3)
GFR SERPL CREATININE-BSD FRML MDRD: 60 ML/MIN/1.73M*2

## 2024-10-14 PROCEDURE — 74177 CT ABD & PELVIS W/CONTRAST: CPT

## 2024-10-14 PROCEDURE — 2550000001 HC RX 255 CONTRASTS: Performed by: INTERNAL MEDICINE

## 2024-10-14 PROCEDURE — 74177 CT ABD & PELVIS W/CONTRAST: CPT | Performed by: RADIOLOGY

## 2024-10-14 PROCEDURE — 82565 ASSAY OF CREATININE: CPT

## 2024-10-16 ENCOUNTER — TELEPHONE (OUTPATIENT)
Dept: PRIMARY CARE | Facility: CLINIC | Age: 54
End: 2024-10-16
Payer: COMMERCIAL

## 2024-10-16 DIAGNOSIS — R93.5 ABNORMAL CT OF THE ABDOMEN: Primary | ICD-10-CM

## 2024-10-16 NOTE — TELEPHONE ENCOUNTER
Called pt. Ct borderline hepatomegaly and area of fat. Lfts normal July. Obtain repeat now and check hepatitis panel. She will call Dr Parkinson and set up appt.

## 2024-11-19 ENCOUNTER — HOSPITAL ENCOUNTER (OUTPATIENT)
Dept: RADIOLOGY | Facility: CLINIC | Age: 54
Discharge: HOME | End: 2024-11-19
Payer: COMMERCIAL

## 2024-11-19 ENCOUNTER — APPOINTMENT (OUTPATIENT)
Dept: RADIOLOGY | Facility: CLINIC | Age: 54
End: 2024-11-19
Payer: COMMERCIAL

## 2024-11-19 ENCOUNTER — LAB (OUTPATIENT)
Dept: LAB | Facility: LAB | Age: 54
End: 2024-11-19
Payer: COMMERCIAL

## 2024-11-19 DIAGNOSIS — Z90.5 H/O RIGHT NEPHRECTOMY: ICD-10-CM

## 2024-11-19 DIAGNOSIS — R10.32 LEFT LOWER QUADRANT ABDOMINAL PAIN: ICD-10-CM

## 2024-11-19 DIAGNOSIS — C64.1 MALIGNANT NEOPLASM OF RIGHT KIDNEY, EXCEPT RENAL PELVIS: ICD-10-CM

## 2024-11-19 DIAGNOSIS — R93.5 ABNORMAL CT OF THE ABDOMEN: ICD-10-CM

## 2024-11-19 DIAGNOSIS — R79.0 LOW FERRITIN: ICD-10-CM

## 2024-11-19 DIAGNOSIS — Z12.31 ENCOUNTER FOR SCREENING MAMMOGRAM FOR BREAST CANCER: ICD-10-CM

## 2024-11-19 LAB
ALBUMIN SERPL BCP-MCNC: 4 G/DL (ref 3.4–5)
ALP SERPL-CCNC: 101 U/L (ref 33–110)
ALT SERPL W P-5'-P-CCNC: 15 U/L (ref 7–45)
AST SERPL W P-5'-P-CCNC: 16 U/L (ref 9–39)
BILIRUB DIRECT SERPL-MCNC: 0.1 MG/DL (ref 0–0.3)
BILIRUB SERPL-MCNC: 0.4 MG/DL (ref 0–1.2)
CREAT SERPL-MCNC: 1.14 MG/DL (ref 0.5–1.05)
EGFRCR SERPLBLD CKD-EPI 2021: 58 ML/MIN/1.73M*2
FERRITIN SERPL-MCNC: 21 NG/ML (ref 8–150)
HAV IGM SER QL: NONREACTIVE
HBV CORE IGM SER QL: NONREACTIVE
HBV SURFACE AG SERPL QL IA: NONREACTIVE
HCV AB SER QL: NONREACTIVE
IRON SATN MFR SERPL: 14 % (ref 25–45)
IRON SERPL-MCNC: 53 UG/DL (ref 35–150)
PROT SERPL-MCNC: 6.8 G/DL (ref 6.4–8.2)
TIBC SERPL-MCNC: 378 UG/DL (ref 240–445)
UIBC SERPL-MCNC: 325 UG/DL (ref 110–370)

## 2024-11-19 PROCEDURE — 80076 HEPATIC FUNCTION PANEL: CPT

## 2024-11-19 PROCEDURE — 80074 ACUTE HEPATITIS PANEL: CPT

## 2024-11-19 PROCEDURE — 77067 SCR MAMMO BI INCL CAD: CPT | Performed by: RADIOLOGY

## 2024-11-19 PROCEDURE — 82565 ASSAY OF CREATININE: CPT

## 2024-11-19 PROCEDURE — 77063 BREAST TOMOSYNTHESIS BI: CPT | Performed by: RADIOLOGY

## 2024-11-19 PROCEDURE — 77067 SCR MAMMO BI INCL CAD: CPT

## 2024-11-19 PROCEDURE — 83540 ASSAY OF IRON: CPT

## 2024-11-19 PROCEDURE — 82728 ASSAY OF FERRITIN: CPT

## 2024-11-19 PROCEDURE — 83550 IRON BINDING TEST: CPT

## 2024-11-19 PROCEDURE — 36415 COLL VENOUS BLD VENIPUNCTURE: CPT

## 2024-11-20 ENCOUNTER — TELEPHONE (OUTPATIENT)
Dept: PRIMARY CARE | Facility: CLINIC | Age: 54
End: 2024-11-20
Payer: COMMERCIAL

## 2024-11-20 DIAGNOSIS — R92.8 ABNORMAL MAMMOGRAM: ICD-10-CM

## 2024-11-20 DIAGNOSIS — Z90.5 H/O RIGHT NEPHRECTOMY: Primary | ICD-10-CM

## 2024-11-20 RX ORDER — TRAZODONE HYDROCHLORIDE 50 MG/1
50 TABLET ORAL NIGHTLY
COMMUNITY
Start: 2024-10-22 | End: 2025-04-20

## 2024-11-20 RX ORDER — BUPROPION HYDROCHLORIDE 150 MG/1
1 TABLET ORAL
COMMUNITY
Start: 2024-11-15

## 2024-11-20 NOTE — TELEPHONE ENCOUNTER
Called pt. She will call Olivia and set up additional testing right breast.   Discussed labs-she wonders if anything else can be done to preserve renal function. Will have her see nephrologist to determine if any other recommendations.

## 2024-11-25 ENCOUNTER — APPOINTMENT (OUTPATIENT)
Dept: NEUROLOGY | Facility: CLINIC | Age: 54
End: 2024-11-25
Payer: COMMERCIAL

## 2024-11-25 VITALS
DIASTOLIC BLOOD PRESSURE: 83 MMHG | WEIGHT: 225 LBS | BODY MASS INDEX: 36.16 KG/M2 | HEART RATE: 72 BPM | HEIGHT: 66 IN | SYSTOLIC BLOOD PRESSURE: 131 MMHG

## 2024-11-25 DIAGNOSIS — R25.1 PHYSIOLOGICAL TREMOR: ICD-10-CM

## 2024-11-25 DIAGNOSIS — G31.84 MILD COGNITIVE IMPAIRMENT: Primary | ICD-10-CM

## 2024-11-25 PROCEDURE — 3008F BODY MASS INDEX DOCD: CPT | Performed by: PSYCHIATRY & NEUROLOGY

## 2024-11-25 PROCEDURE — 99204 OFFICE O/P NEW MOD 45 MIN: CPT | Performed by: PSYCHIATRY & NEUROLOGY

## 2024-11-25 PROCEDURE — 99214 OFFICE O/P EST MOD 30 MIN: CPT | Mod: GC | Performed by: PSYCHIATRY & NEUROLOGY

## 2024-11-25 PROCEDURE — 1036F TOBACCO NON-USER: CPT | Performed by: PSYCHIATRY & NEUROLOGY

## 2024-11-25 ASSESSMENT — ENCOUNTER SYMPTOMS
LOSS OF SENSATION IN FEET: 0
OCCASIONAL FEELINGS OF UNSTEADINESS: 0
DEPRESSION: 1

## 2024-11-25 ASSESSMENT — UNIFIED PARKINSONS DISEASE RATING SCALE (UPDRS)
SPONTANEITY_OF_MOVEMENT: 0
RIGIDITY_LLE: 0
POSTURAL_STABILITY: 0
TOETAPPING_RIGHT: 0
AMPLITUDE_LUE: 1
AMPLITUDE_LIP_JAW: 0
TOTAL_SCORE: 7
LEG_AGILITY_LEFT: 0
FACIAL_EXPRESSION: 0
PRONATION_SUPINATION_LEFT: 0
PRONATION_SUPINATION_RIGHT: 0
POSTURE: 0
POSTURAL_TREMOR_LEFTHAND: 1
RIGIDITY_LUE: 0
LEG_AGILITY_RIGHT: 0
LEVODOPA: NO
GAIT: 0
RIGIDITY_RLE: 0
FINGER_TAPPING_RIGHT: 0
CHAIR_RISING_SCALE: 0
KINETIC_TREMOR_LEFTHAND: 0
TOETAPPING_LEFT: 1
PARKINSONS_MEDS: NO
KINETIC_TREMOR_RIGHTHAND: 0
AMPLITUDE_RUE: 1
FREEZING_GAIT: 0
SPEECH: 0
CONSTANCY_TREMOR_ATREST: 1
DYSKINESIAS_PRESENT: NO
RIGIDITY_NECK: 0
HANDMOVEMENTS_RIGHT: 0
POSTURAL_TREMOR_RIGHTHAND: 1
FINGER_TAPPING_LEFT: 1
RIGIDITY_RUE: 0
AMPLITUDE_LLE: 0
AMPLITUDE_RLE: 0

## 2024-11-25 ASSESSMENT — PATIENT HEALTH QUESTIONNAIRE - PHQ9
SUM OF ALL RESPONSES TO PHQ9 QUESTIONS 1 & 2: 2
2. FEELING DOWN, DEPRESSED OR HOPELESS: SEVERAL DAYS
SUM OF ALL RESPONSES TO PHQ9 QUESTIONS 1 AND 2: 2
2. FEELING DOWN, DEPRESSED OR HOPELESS: SEVERAL DAYS
1. LITTLE INTEREST OR PLEASURE IN DOING THINGS: SEVERAL DAYS
1. LITTLE INTEREST OR PLEASURE IN DOING THINGS: SEVERAL DAYS

## 2024-11-25 NOTE — PATIENT INSTRUCTIONS
Ms. Nicole,    You were seen for tremors and memory difficulties. Your exam reveals mild tremors, not concerning for Parkinson's. Given that propranolol and biotin have not improved tremors, we will discontinue these medications. Your exam also revealed mild cognitive impairment. We recommend further bloodwork and an MRI scan of your brain. We also referred you for neuropsychological testing. Please contact us at [phone] if you have any questions or concerns. Please return to clinic in 6 months.

## 2024-11-25 NOTE — LETTER
"November 25, 2024     Britta Siddiqui MD  [id number]  [facility] [address]    Patient: Park Nicole   YOB: 1970   Date of Visit: 11/25/2024       Dear Dr. Britta Siddiqui MD:    Thank you for referring Park Nicole to me for evaluation. Below are my notes for this consultation.  If you have questions, please do not hesitate to call me. I look forward to following your patient along with you.       Sincerely,     Staci Tavares MD      CC: No Recipients  ______________________________________________________________________________________    Subjective   Park Nicole is a 54 y.o. year old female with PMHx RCC s/p R nephrectomy (2021), hyperthyroidism, ROSALBA, PLMD, anxiety, depression who presents for tremor evaluation.    Per chart review, tremors started in 2022: around the same time, patient started on Wellbutrin for worsening anxiety. At the time, Wellbutrin was working well for anxiety but patient noticed \"new onset tremors.\" Most recently followed with [facility]rology NP at [facility] in 3/2024: patient was improving on amantadine. No recent TSH available for review; Endocrinology following (last seen 9/2024).    Per patient, tremors started during adolescence, right more than left. Tremors continue to be confined to BUE but have worsened (more pronounced) over the past 10 years. Most noticeable with tasks (e.g. photography), but able to write, button clothes and use utensils. Has not interfered with social activities. Patient denied benefits with propranolol, biotin; patient does not remember effects of amantadine (she stopped taking the medication a few months prior). Tremors have slightly worsened over past 6 months. Patient denied freezing but described \"wobbly legs\" without falls. Denied anosmia, RBD, orthostasis, AVH. Reported constipation in past 4 months, that improved with hydration and stool softener.     Stopped Strattera in 10/2024. Restarted Wellbutrin in 9/2024 (patient denied taking " "it for \"several years\"). Started trazodone 2-3 months ago. Has been taking buspirone and venlafaxine at current dose for several years.    Patient also reported cognitive slowing and memory difficulties (e.g. trouble recalling names of students) in past few months. Sleeps 7 hours per night, usually feels well-rested upon waking. Consistent CPAP use. Denied current mood dysregulation but endorsed significant emotional stressors a few months ago. Patient drinks one cup of coffee in AM; sometimes has coffee or caffeinated soda in PM.    Denied family history of tremors or neurodegenerative conditions.    Working as teacher. Never smoker. Rarely uses alcohol; reported minimal improvement of tremors with alcohol. Denied illicit substance use.     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)   • Malignant neoplasm of right kidney, except renal pelvis   • Sleep apnea   • Tremor   • Valgus deformity of foot   • Varicose veins of lower extremity   • Vitamin D deficiency     Past Medical History:   Diagnosis Date   • Personal history of other diseases of the circulatory system     History of cardiac disorder   • Personal history of other diseases of the circulatory system     History of hypertension   • Personal history of other diseases of the musculoskeletal system and connective tissue     History of arthritis   • Personal history of other diseases of the respiratory system     History of asthma     Past Surgical History:   Procedure Laterality Date   • OTHER SURGICAL HISTORY  11/08/2021    Nose surgery   • OTHER SURGICAL HISTORY  " 2021     section   • OTHER SURGICAL HISTORY  2021    Nephrectomy   • OTHER SURGICAL HISTORY  2021    Cholecystectomy laparoscopic   • OTHER SURGICAL HISTORY  2021    Salpingo-oophorectomy bilateral     Social History     Tobacco Use   • Smoking status: Never   • Smokeless tobacco: Never   Substance Use Topics   • Alcohol use: Not on file     family history includes COPD in her mother; Cancer in an other family member; Colon cancer in her paternal grandfather; Diabetes in her mother; Fibromyalgia in her mother; Hypertension in her father and another family member; Lymphoma in her father; Skin cancer in her mother; cardiac disorder in her mother.    Current Outpatient Medications:   •  buPROPion XL (Wellbutrin XL) 150 mg 24 hr tablet, Take 1 tablet (150 mg) by mouth early in the morning.., Disp: , Rfl:   •  traZODone (Desyrel) 50 mg tablet, Take 1 tablet (50 mg) by mouth once daily at bedtime., Disp: , Rfl:   •  amantadine (Symmetrel) 100 mg tablet, 1 tablet (100 mg) 2 times a day., Disp: , Rfl:   •  atomoxetine (Strattera) 60 mg capsule, Take 1 capsule (60 mg) by mouth once daily., Disp: , Rfl:   •  atorvastatin (Lipitor) 20 mg tablet, Take 1 tablet (20 mg) by mouth once daily., Disp: , Rfl:   •  biotin 10,000 mcg capsule, Take 1 capsule (10 mg) by mouth once daily., Disp: , Rfl:   •  busPIRone (Buspar) 10 mg tablet, Take 2 tablets (20 mg) by mouth 3 times a day., Disp: , Rfl:   •  ferrous sulfate, 325 mg ferrous sulfate, tablet, Take 1 tablet (325 mg) by mouth 3 times a week., Disp: , Rfl:   •  fluticasone (Flonase) 50 mcg/actuation nasal spray, Administer 1 spray into each nostril once daily. Shake gently. Before first use, prime pump. After use, clean tip and replace cap., Disp: 16 g, Rfl: 11  •  gabapentin (Neurontin) 400 mg capsule, Take 1 capsule (400 mg) by mouth once daily., Disp: , Rfl:   •  hydrOXYzine HCL (Atarax) 25 mg tablet, Take 1 tablet (25 mg) by mouth 2 times a day as  needed for anxiety., Disp: , Rfl:   •  losartan (Cozaar) 50 mg tablet, Take 1 tablet (50 mg) by mouth once daily., Disp: , Rfl:   •  metFORMIN (Glucophage) 500 mg tablet, Take by mouth. TAKE ONE TABLET IN THE AM AND THREE TABLETS IN THE PM, Disp: , Rfl:   •  multivitamin (Daily Multi-Vitamin) tablet, Take 1 tablet by mouth once daily., Disp: , Rfl:   •  omeprazole (PriLOSEC) 40 mg DR capsule, Take 2 capsules (80 mg) by mouth once daily., Disp: , Rfl:   •  Ozempic 1 mg/dose (4 mg/3 mL) pen injector, Inject 1 mg under the skin 1 (one) time per week., Disp: , Rfl:   •  propranolol (Inderal) 20 mg tablet, Take 1 tablet (20 mg) by mouth 2 times a day., Disp: , Rfl:   •  venlafaxine XR (Effexor-XR) 150 mg 24 hr capsule, Take 1 capsule (150 mg) by mouth twice a day., Disp: , Rfl:   No Known Allergies    Objective   Vitals:    11/25/24 1309   BP: 131/83   Pulse: 72      Physical Exam  Neurological Exam:  MENTAL STATUS:  General appearance: alert, mildly anxious, NAD  Orientation: person, place, time    MOCA 23/30 on 11/25/2024. 5/5 visual spacial. 3/3 naming. 5/6 attention. 2/3 language. 2/2 abstraction. 0/5 recall. 6/6 orientation.     CRANIAL NERVES:  - II:  Visual fields intact to confrontation bilaterally  - III, IV, VI: EOMI to pursuit without nystagmus  - V: V1-V3 sensation intact bilaterally  - VII: Face muscles symmetric with smile and eye closure  - VIII: Intact to voice  - IX, X: Palate elevated symmetrically bilaterally, no hoarseness  - XI: 5/5 strength on shoulder shrugging bilaterally  - XII: Tongue midline without atrophy or fasciculation    MOTOR: Tone and bulk normal in all extremities; very mild, fine tremors in bilateral hands. Archimedes spirals and handwriting intact.    STRENGTH: R L  Deltoid  5 5  Biceps  5 5  Triceps  5 5    5 5    Hip flexion 5 5  Quadriceps 5 5  Hamstrings 5 5    REFLEXES:  R  L  Biceps   2+ 2+  Triceps   2+ 2+  Patellar   2+ 2+    COORDINATION: Intact on finger to nose bl,  intact on heel to shin bl    SENSORY: grossly intact to light touch in bl UE and LE    GAIT: Normal standard gait     MDS UPDRS 1st Score:   Motor Examination  Is the patient on medication for treating the symptoms of Parkinson's Disease?: No  Is the patient on Levodopa?: No  Speech: 0  Facial Expression: 0  Rigidty Neck: 0  Rigidty RUE: 0  Rigidity - LUE: 0  Rigidity RLE: 0  Rigidity LLE: 0  Finger Tapping Right Hand: 0  Finger Tapping Left Hand: 1  Hand Movements- Right Hand: 0  Hand Movements- Left Hand: 0  Pronatiaon-Supination Movments - Right Hand: 0  Pronatiaon-Supination Movments Left Hand: 0  Toe Tapping Right Foot: 0  Toe Tapping - Left Foot: 1  Leg Agility - Right Le  Leg Agility - Left le  Arising from Chair: 0  Gait: 0  Freezing of Gait: 0  Postural Stability: 0  Posture: 0  Global Spontanteity of Movment ( Body Bradykinesia): 0  Postural Tremor - Right Hand: 1  Postural Tremor - Left hand: 1  Kinetic Tremor - Right hand: 0  Kinetic Tremor - Left hand: 0  Rest Tremor Amplitude - RUE: 1  Rest Tremor Amplitude - LUE: 1  Rest Tremor Amplitude - RLE: 0  Rest Tremor Amplitude - LLE: 0  Rest Tremor Amplitude - Lip/Jaw: 0  Constancy of Rest Tremor: 1  MDS UPDRS Total Score: 7  Were dyskinesias (chorea or dystonia) present during examination?: No      Assessment/Plan   Park Nicole is a 54 y.o. year old female with PMHx RCC s/p R nephrectomy (), hyperthyroidism, ROSALBA, PLMD, anxiety, depression who presents for tremor evaluation. Mild, fine tremor on exam suggestive of enhanced physiological tremor vs mild ET. As tremors do not significantly interfere with daily activity, reasonable to defer treatment. MoCA reflective of mild cognitive impairment: differential includes mood disorder vs metabolic encephalopathy vs primary neurodegenerative process. Further work-up is reasonable.    - discontinue propranolol and biotin i/s/o reported minimal benefits for tremor  - MRI brain wo contrast to evaluate for  structural etiology for MCI  - obtain serum B12, TSH to evaluate for metabolic etiology for MCI  - referral to Neuropsychology for comprehensive cognitive testing  - referral to PT for reported gait instability  - RTC 6 months    Patient seen and discussed with attending physician, Dr. Tavares.    Melinda Bryant  PGY-4 Neurology     I saw and evaluated the patient. I personally obtained the key and critical portions of the history and physical exam or was physically present for key and critical portions performed by the resident/fellow. I reviewed the resident/fellow's documentation and discussed the patient with the resident/fellow. I agree with the resident/fellow's medical decision making as documented in the note. Very mild kinetic tremor, more concerning is cognitive changes in a 54 year old. Will work up.     Staci Tavares MD     For the Evaluation and Management of this patient, the level of Medical Decision Making for this visit was determined based on the following:    The level of COMPLEXITY AND NUMBER OF PROBLEMS ADDRESSED was [MODERATE] as determined by:     MODERATE:  undiagnosed new problem with uncertain prognosis.        The AMOUNT/COMPLEXITY OF DATA TO REVIEW (reviewed, ordered or call for) was [ MODERATE as determined by:  lMODERATE (need one of the three):  my review of prior external notes and testing results as well as ordering a new unique test.        The level of RISK OF COMPLICATIONS was [ LOW] as determined by:      LOW:  A low risk of morbidity from additional diagnostic testing or treatment.      Thus, the level of medical decision making (based on the lower of the two highest elements) was determined to be [MODERATE, Therefore the appropriate E/M code for this encounter is [ 21726/

## 2024-11-25 NOTE — PROGRESS NOTES
"Subjective   Park Nicole is a 54 y.o. year old female with PMHx RCC s/p R nephrectomy (2021), hyperthyroidism, ROSALBA, PLMD, anxiety, depression who presents for tremor evaluation.    Per chart review, tremors started in 2022: around the same time, patient started on Wellbutrin for worsening anxiety. At the time, Wellbutrin was working well for anxiety but patient noticed \"new onset tremors.\" Most recently followed with Neurology NP at [facility] in 3/2024: patient was improving on amantadine. No recent TSH available for review; Endocrinology following (last seen 9/2024).    Per patient, tremors started during adolescence, right more than left. Tremors continue to be confined to BUE but have worsened (more pronounced) over the past 10 years. Most noticeable with tasks (e.g. photography), but able to write, button clothes and use utensils. Has not interfered with social activities. Patient denied benefits with propranolol, biotin; patient does not remember effects of amantadine (she stopped taking the medication a few months prior). Tremors have slightly worsened over past 6 months. Patient denied freezing but described \"wobbly legs\" without falls. Denied anosmia, RBD, orthostasis, AVH. Reported constipation in past 4 months, that improved with hydration and stool softener.     Stopped Strattera in 10/2024. Restarted Wellbutrin in 9/2024 (patient denied taking it for \"several years\"). Started trazodone 2-3 months ago. Has been taking buspirone and venlafaxine at current dose for several years.    Patient also reported cognitive slowing and memory difficulties (e.g. trouble recalling names of students) in past few months. Sleeps 7 hours per night, usually feels well-rested upon waking. Consistent CPAP use. Denied current mood dysregulation but endorsed significant emotional stressors a few months ago. Patient drinks one cup of coffee in AM; sometimes has coffee or caffeinated soda in PM.    Denied family history of tremors " or neurodegenerative conditions.    Working as teacher. Never smoker. Rarely uses alcohol; reported minimal improvement of tremors with alcohol. Denied illicit substance use.     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)    Malignant neoplasm of right kidney, except renal pelvis    Sleep apnea    Tremor    Valgus deformity of foot    Varicose veins of lower extremity    Vitamin D deficiency     Past Medical History:   Diagnosis Date    Personal history of other diseases of the circulatory system     History of cardiac disorder    Personal history of other diseases of the circulatory system     History of hypertension    Personal history of other diseases of the musculoskeletal system and connective tissue     History of arthritis    Personal history of other diseases of the respiratory system     History of asthma     Past Surgical History:   Procedure Laterality Date    OTHER SURGICAL HISTORY  2021    Nose surgery    OTHER SURGICAL HISTORY  2021     section    OTHER SURGICAL HISTORY  2021    Nephrectomy    OTHER SURGICAL HISTORY  2021    Cholecystectomy laparoscopic    OTHER SURGICAL HISTORY  2021    Salpingo-oophorectomy bilateral     Social History     Tobacco Use    Smoking status: Never    Smokeless tobacco: Never   Substance Use Topics    Alcohol use: Not on file     family history includes COPD in her mother; Cancer in an other family member; Colon cancer in her paternal grandfather; Diabetes in her mother; Fibromyalgia in her mother; Hypertension in her father and another family member; Lymphoma  in her father; Skin cancer in her mother; cardiac disorder in her mother.    Current Outpatient Medications:     buPROPion XL (Wellbutrin XL) 150 mg 24 hr tablet, Take 1 tablet (150 mg) by mouth early in the morning.., Disp: , Rfl:     traZODone (Desyrel) 50 mg tablet, Take 1 tablet (50 mg) by mouth once daily at bedtime., Disp: , Rfl:     amantadine (Symmetrel) 100 mg tablet, 1 tablet (100 mg) 2 times a day., Disp: , Rfl:     atomoxetine (Strattera) 60 mg capsule, Take 1 capsule (60 mg) by mouth once daily., Disp: , Rfl:     atorvastatin (Lipitor) 20 mg tablet, Take 1 tablet (20 mg) by mouth once daily., Disp: , Rfl:     biotin 10,000 mcg capsule, Take 1 capsule (10 mg) by mouth once daily., Disp: , Rfl:     busPIRone (Buspar) 10 mg tablet, Take 2 tablets (20 mg) by mouth 3 times a day., Disp: , Rfl:     ferrous sulfate, 325 mg ferrous sulfate, tablet, Take 1 tablet (325 mg) by mouth 3 times a week., Disp: , Rfl:     fluticasone (Flonase) 50 mcg/actuation nasal spray, Administer 1 spray into each nostril once daily. Shake gently. Before first use, prime pump. After use, clean tip and replace cap., Disp: 16 g, Rfl: 11    gabapentin (Neurontin) 400 mg capsule, Take 1 capsule (400 mg) by mouth once daily., Disp: , Rfl:     hydrOXYzine HCL (Atarax) 25 mg tablet, Take 1 tablet (25 mg) by mouth 2 times a day as needed for anxiety., Disp: , Rfl:     losartan (Cozaar) 50 mg tablet, Take 1 tablet (50 mg) by mouth once daily., Disp: , Rfl:     metFORMIN (Glucophage) 500 mg tablet, Take by mouth. TAKE ONE TABLET IN THE AM AND THREE TABLETS IN THE PM, Disp: , Rfl:     multivitamin (Daily Multi-Vitamin) tablet, Take 1 tablet by mouth once daily., Disp: , Rfl:     omeprazole (PriLOSEC) 40 mg DR capsule, Take 2 capsules (80 mg) by mouth once daily., Disp: , Rfl:     Ozempic 1 mg/dose (4 mg/3 mL) pen injector, Inject 1 mg under the skin 1 (one) time per week., Disp: , Rfl:     propranolol (Inderal) 20 mg tablet, Take 1 tablet (20  mg) by mouth 2 times a day., Disp: , Rfl:     venlafaxine XR (Effexor-XR) 150 mg 24 hr capsule, Take 1 capsule (150 mg) by mouth twice a day., Disp: , Rfl:   No Known Allergies    Objective   Vitals:    11/25/24 1309   BP: 131/83   Pulse: 72      Physical Exam  Neurological Exam:  MENTAL STATUS:  General appearance: alert, mildly anxious, NAD  Orientation: person, place, time    MOCA 23/30 on 11/25/2024. 5/5 visual spacial. 3/3 naming. 5/6 attention. 2/3 language. 2/2 abstraction. 0/5 recall. 6/6 orientation.     CRANIAL NERVES:  - II:  Visual fields intact to confrontation bilaterally  - III, IV, VI: EOMI to pursuit without nystagmus  - V: V1-V3 sensation intact bilaterally  - VII: Face muscles symmetric with smile and eye closure  - VIII: Intact to voice  - IX, X: Palate elevated symmetrically bilaterally, no hoarseness  - XI: 5/5 strength on shoulder shrugging bilaterally  - XII: Tongue midline without atrophy or fasciculation    MOTOR: Tone and bulk normal in all extremities; very mild, fine tremors in bilateral hands. Archimedes spirals and handwriting intact.    STRENGTH: R L  Deltoid  5 5  Biceps  5 5  Triceps  5 5    5 5    Hip flexion 5 5  Quadriceps 5 5  Hamstrings 5 5    REFLEXES:  R  L  Biceps   2+ 2+  Triceps   2+ 2+  Patellar   2+ 2+    COORDINATION: Intact on finger to nose bl, intact on heel to shin bl    SENSORY: grossly intact to light touch in bl UE and LE    GAIT: Normal standard gait     MDS UPDRS 1st Score:   Motor Examination  Is the patient on medication for treating the symptoms of Parkinson's Disease?: No  Is the patient on Levodopa?: No  Speech: 0  Facial Expression: 0  Rigidty Neck: 0  Rigidty RUE: 0  Rigidity - LUE: 0  Rigidity RLE: 0  Rigidity LLE: 0  Finger Tapping Right Hand: 0  Finger Tapping Left Hand: 1  Hand Movements- Right Hand: 0  Hand Movements- Left Hand: 0  Pronatiaon-Supination Movments - Right Hand: 0  Pronatiaon-Supination Movments Left Hand: 0  Toe Tapping Right  Foot: 0  Toe Tapping - Left Foot: 1  Leg Agility - Right Le  Leg Agility - Left le  Arising from Chair: 0  Gait: 0  Freezing of Gait: 0  Postural Stability: 0  Posture: 0  Global Spontanteity of Movment ( Body Bradykinesia): 0  Postural Tremor - Right Hand: 1  Postural Tremor - Left hand: 1  Kinetic Tremor - Right hand: 0  Kinetic Tremor - Left hand: 0  Rest Tremor Amplitude - RUE: 1  Rest Tremor Amplitude - LUE: 1  Rest Tremor Amplitude - RLE: 0  Rest Tremor Amplitude - LLE: 0  Rest Tremor Amplitude - Lip/Jaw: 0  Constancy of Rest Tremor: 1  MDS UPDRS Total Score: 7  Were dyskinesias (chorea or dystonia) present during examination?: No      Assessment/Plan   aPrk Nicole is a 54 y.o. year old female with PMHx RCC s/p R nephrectomy (), hyperthyroidism, ROSALBA, PLMD, anxiety, depression who presents for tremor evaluation. Mild, fine tremor on exam suggestive of enhanced physiological tremor vs mild ET. As tremors do not significantly interfere with daily activity, reasonable to defer treatment. MoCA reflective of mild cognitive impairment: differential includes mood disorder vs metabolic encephalopathy vs primary neurodegenerative process. Further work-up is reasonable.    - discontinue propranolol and biotin i/s/o reported minimal benefits for tremor  - MRI brain wo contrast to evaluate for structural etiology for MCI  - obtain serum B12, TSH to evaluate for metabolic etiology for MCI  - referral to Neuropsychology for comprehensive cognitive testing  - referral to PT for reported gait instability  - RTC 6 months    Patient seen and discussed with attending physician, Dr. Tavares.    Melinda Bryant  PGY-4 Neurology     I saw and evaluated the patient. I personally obtained the key and critical portions of the history and physical exam or was physically present for key and critical portions performed by the resident/fellow. I reviewed the resident/fellow's documentation and discussed the patient with the  resident/fellow. I agree with the resident/fellow's medical decision making as documented in the note. Very mild kinetic tremor, more concerning is cognitive changes in a 54 year old. Will work up.     Staci Tavares MD     For the Evaluation and Management of this patient, the level of Medical Decision Making for this visit was determined based on the following:    The level of COMPLEXITY AND NUMBER OF PROBLEMS ADDRESSED was [MODERATE] as determined by:     MODERATE:  undiagnosed new problem with uncertain prognosis.        The AMOUNT/COMPLEXITY OF DATA TO REVIEW (reviewed, ordered or call for) was [ MODERATE as determined by:  lMODERATE (need one of the three):  my review of prior external notes and testing results as well as ordering a new unique test.        The level of RISK OF COMPLICATIONS was [ LOW] as determined by:      LOW:  A low risk of morbidity from additional diagnostic testing or treatment.      Thus, the level of medical decision making (based on the lower of the two highest elements) was determined to be [MODERATE, Therefore the appropriate E/M code for this encounter is [ 79843/

## 2024-11-29 ENCOUNTER — APPOINTMENT (OUTPATIENT)
Dept: NEPHROLOGY | Facility: CLINIC | Age: 54
End: 2024-11-29
Payer: COMMERCIAL

## 2024-12-05 ENCOUNTER — HOSPITAL ENCOUNTER (OUTPATIENT)
Dept: RADIOLOGY | Facility: CLINIC | Age: 54
Discharge: HOME | End: 2024-12-05
Payer: COMMERCIAL

## 2024-12-05 DIAGNOSIS — R92.8 ABNORMAL MAMMOGRAM: ICD-10-CM

## 2024-12-05 DIAGNOSIS — R92.8 ABNORMAL MAMMOGRAM: Primary | ICD-10-CM

## 2024-12-05 PROCEDURE — 76982 USE 1ST TARGET LESION: CPT | Mod: 50,RT

## 2024-12-05 PROCEDURE — 76642 ULTRASOUND BREAST LIMITED: CPT | Mod: RT

## 2024-12-05 PROCEDURE — 77061 BREAST TOMOSYNTHESIS UNI: CPT | Mod: RT

## 2024-12-16 ENCOUNTER — HOSPITAL ENCOUNTER (OUTPATIENT)
Dept: RADIOLOGY | Facility: CLINIC | Age: 54
Discharge: HOME | End: 2024-12-16
Payer: COMMERCIAL

## 2024-12-16 DIAGNOSIS — G31.84 MILD COGNITIVE IMPAIRMENT: ICD-10-CM

## 2024-12-16 PROCEDURE — 70551 MRI BRAIN STEM W/O DYE: CPT

## 2024-12-18 NOTE — PROGRESS NOTES
Left message on pts phone re MRI brain and to call office back if any questions   If calls back can relay:  Essentially her brain scan is normal - but possibly some sebaceous cysts in scalp.   This can be followed by dermatology if needed or if they are painful.

## 2024-12-23 ENCOUNTER — HOSPITAL ENCOUNTER (OUTPATIENT)
Dept: RADIOLOGY | Facility: CLINIC | Age: 54
Discharge: HOME | End: 2024-12-23
Payer: COMMERCIAL

## 2024-12-23 ENCOUNTER — OFFICE VISIT (OUTPATIENT)
Dept: ORTHOPEDIC SURGERY | Facility: CLINIC | Age: 54
End: 2024-12-23
Payer: COMMERCIAL

## 2024-12-23 DIAGNOSIS — M25.562 ACUTE PAIN OF LEFT KNEE: Primary | ICD-10-CM

## 2024-12-23 DIAGNOSIS — M25.562 ACUTE PAIN OF LEFT KNEE: ICD-10-CM

## 2024-12-23 PROCEDURE — 99213 OFFICE O/P EST LOW 20 MIN: CPT | Performed by: ORTHOPAEDIC SURGERY

## 2024-12-23 PROCEDURE — 99203 OFFICE O/P NEW LOW 30 MIN: CPT | Performed by: ORTHOPAEDIC SURGERY

## 2024-12-23 PROCEDURE — 73564 X-RAY EXAM KNEE 4 OR MORE: CPT | Mod: LT

## 2024-12-23 PROCEDURE — 1036F TOBACCO NON-USER: CPT | Performed by: ORTHOPAEDIC SURGERY

## 2024-12-23 NOTE — PROGRESS NOTES
Chief Complaint   Chief Complaint   Patient presents with    Left Knee - Pain         HPI:      Park Nicole is a pleasant 54 y.o. year-old female who is seen today for left knee injury she twisted and contused her left knee 2 weeks ago no prior problems hurts the medial aspect the joint hurts when she squats there is no real locking or catching at this point she has had no treatment    Review of Systems all other body systems have been reviewed and are negative for complaint.    There were no vitals filed for this visit.    Past Medical History:   Diagnosis Date    Personal history of other diseases of the circulatory system     History of cardiac disorder    Personal history of other diseases of the circulatory system     History of hypertension    Personal history of other diseases of the musculoskeletal system and connective tissue     History of arthritis    Personal history of other diseases of the respiratory system     History of asthma     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 Sig Documenting Provider Last Dose Status   atorvastatin (Lipitor) 20 mg tablet [id number] No Take 1 tablet (20 mg) by mouth once daily. Historical Provider, MD Taking Active   buPROPion XL (Wellbutrin XL) 150 mg 24 hr tablet [id number]  Take 1 tablet (150 mg) by  mouth early in the morning.. Kamran Feliciano MD  Active   busPIRone (Buspar) 10 mg tablet [id number] No Take 2 tablets (20 mg) by mouth 3 times a day. Kamran Feliciano MD Taking Active   ferrous sulfate, 325 mg ferrous sulfate, tablet [id number] No Take 1 tablet (325 mg) by mouth 3 times a week.   Patient not taking: Reported on 11/25/2024    Kamran Feliciano MD Taking Active   fluticasone (Flonase) 50 mcg/actuation nasal spray [id number] No Administer 1 spray into each nostril once daily. Shake gently. Before first use, prime pump. After use, clean tip and replace cap.   Patient not taking: Reported on 11/25/2024    Britta Siddiqui MD Taking Active   gabapentin (Neurontin) 400 mg capsule [id number] No Take 1 capsule (400 mg) by mouth once daily. Kamran Feliciano MD Taking Active   hydrOXYzine HCL (Atarax) 25 mg tablet [id number] No Take 1 tablet (25 mg) by mouth 2 times a day as needed for anxiety. Kamran Feliciano MD Taking Active   losartan (Cozaar) 50 mg tablet [id number] No Take 1 tablet (50 mg) by mouth once daily. Kamran Feliciano MD Taking Active   metFORMIN (Glucophage) 500 mg tablet [id number] No Take by mouth. TAKE ONE TABLET IN THE AM AND THREE TABLETS IN THE PM Kamran Feliciano MD Taking Active   multivitamin (Daily Multi-Vitamin) tablet [id number] No Take 1 tablet by mouth once daily. Kamran Feliciano MD Taking Active   omeprazole (PriLOSEC) 40 mg DR capsule [id number] No Take 2 capsules (80 mg) by mouth once daily. Kamran Feliciano MD Taking Active   Ozempic 1 mg/dose (4 mg/3 mL) pen injector [id number] No Inject 1 mg under the skin 1 (one) time per week. Kamran Feliciano MD Taking Active   traZODone (Desyrel) 50 mg tablet [id number]  Take 1 tablet (50 mg) by mouth once daily at bedtime. Kamran Feliciano MD  Active   venlafaxine XR (Effexor-XR) 150 mg 24 hr capsule [id number] No Take 1 capsule (150 mg) by mouth twice a day. Kamran Feliciano MD Taking Active          "           No Known Allergies    Social History     Socioeconomic History    Marital status:      Spouse name: Not on file    Number of children: Not on file    Years of education: Not on file    Highest education level: Not on file   Occupational History    Not on file   Tobacco Use    Smoking status: Never    Smokeless tobacco: Never   Substance and Sexual Activity    Alcohol use: Not Currently    Drug use: Never    Sexual activity: Not on file   Other Topics Concern    Not on file   Social History Narrative    Not on file     Social Drivers of Health     Financial Resource Strain: Not on file   Food Insecurity: Not on file   Transportation Needs: Not on file   Physical Activity: Not on file   Stress: Not on file   Social Connections: Not on file   Intimate Partner Violence: Not on file   Housing Stability: Not on file       Past Surgical History:   Procedure Laterality Date    OTHER SURGICAL HISTORY  2021    Nose surgery    OTHER SURGICAL HISTORY  2021     section    OTHER SURGICAL HISTORY  2021    Nephrectomy    OTHER SURGICAL HISTORY  2021    Cholecystectomy laparoscopic    OTHER SURGICAL HISTORY  2021    Salpingo-oophorectomy bilateral       There is no height or weight on file to calculate BMI.    HgA1c:   No results found for: \"HGBA1C\", \"OXEILKER9N\"    Physical Exam:    Constitutional: Well-developed well-nourished   Eyes: Sclerae anicteric, pupils equal and round  HENT: Normocephalic atraumatic  Cardiovascular: Pulses full, regular rate and rhythm  Respiratory: Breathing not labored, no wheezing  Integumentary: Skin intact, no lesions or rashes  Neurological: Sensation intact, no gross strength deficits, reflexes equal  Psychiatric: Alert oriented and appropriate  Hematologic/lymphatic: No lymphadenopathy  Left knee: Healing anterior abrasion no effusion tender medial joint line pause Tamara's full range of motion no instability          Imaging:  X-rays " personally reviewed and are negative        Impression/Plan:  Knee contusion possible medial meniscus tear plan activity modification we discussed the natural history of degenerative meniscus tear as well as often resolve on their own she will follow-up in 4 weeks if she is still symptomatic meantime she will treat herself with periodic ice activity modification and anti-inflammatory medication

## 2024-12-23 NOTE — LETTER
December 23, 2024     Britta Siddiqui MD  [id number] [facility] [address]    Patient: Park Nicole   YOB: 1970   Date of Visit: 12/23/2024       Dear Dr. Britta Siddiqui MD:    Thank you for referring Park Nicole to me for evaluation. Below are my notes for this consultation.  If you have questions, please do not hesitate to call me. I look forward to following your patient along with you.       Sincerely,     Bogdan Novak MD      CC: No Recipients  ______________________________________________________________________________________    Chief Complaint   Chief Complaint   Patient presents with   • Left Knee - Pain         HPI:      Park Nicole is a pleasant 54 y.o. year-old female who is seen today for left knee injury she twisted and contused her left knee 2 weeks ago no prior problems hurts the medial aspect the joint hurts when she squats there is no real locking or catching at this point she has had no treatment    Review of Systems all other body systems have been reviewed and are negative for complaint.    There were no vitals filed for this visit.    Past Medical History:   Diagnosis Date   • Personal history of other diseases of the circulatory system     History of cardiac disorder   • Personal history of other diseases of the circulatory system     History of hypertension   • Personal history of other diseases of the musculoskeletal system and connective tissue     History of arthritis   • Personal history of other diseases of the respiratory system     History of asthma     Patient Active Problem List   Diagnosis   • ADD (attention deficit disorder)   • Allergic rhinitis   • Anemia   • Carpal tunnel syndrome on left   • Diarrhea   • Hyperthyroidism   • Diverticular disease   • Dysphagia   • Fatigue   • H/O right nephrectomy   • Hyperglycemia   • Depression   • AI (generalized anxiety disorder)   • Morbid obesity (Multi)   • Obesity (BMI 30-39.9)   • Pain in lower limb    • 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 Sig Documenting Provider Last Dose Status   atorvastatin (Lipitor) 20 mg tablet [id number] No Take 1 tablet (20 mg) by mouth once daily. Historical MD Braden Taking Active   buPROPion XL (Wellbutrin XL) 150 mg 24 hr tablet [id number]  Take 1 tablet (150 mg) by mouth early in the morning.. Historical Provider, MD  Active   busPIRone (Buspar) 10 mg tablet [id number] No Take 2 tablets (20 mg) by mouth 3 times a day. Historical MD Braden Taking Active   ferrous sulfate, 325 mg ferrous sulfate, tablet [id number] No Take 1 tablet (325 mg) by mouth 3 times a week.   Patient not taking: Reported on 11/25/2024    Historical MD Braden Taking Active   fluticasone (Flonase) 50 mcg/actuation nasal spray [id number] No Administer 1 spray into each nostril once daily. Shake gently. Before first use, prime pump. After use, clean tip and replace cap.   Patient not taking: Reported on 11/25/2024    Britta Siddiqui MD Taking Active   gabapentin (Neurontin) 400 mg capsule [id number] No Take 1 capsule (400 mg) by mouth once daily. Historical MD Braden Taking Active   hydrOXYzine HCL (Atarax) 25 mg tablet [id number] No Take 1 tablet (25 mg) by mouth 2 times a day as needed for anxiety. Historical MD Braden Taking Active   losartan (Cozaar) 50 mg tablet [id number] No Take 1 tablet (50 mg) by mouth once daily. Historical MD Braden Taking Active   metFORMIN (Glucophage) 500 mg tablet [id number] No Take by mouth. TAKE ONE TABLET IN THE AM AND THREE TABLETS IN THE PM Historical MD Braden Taking Active   multivitamin (Daily Multi-Vitamin) tablet [id number] No Take 1 tablet by mouth once daily.  "Historical Provider, MD Taking Active   omeprazole (PriLOSEC) 40 mg DR capsule [id number] No Take 2 capsules (80 mg) by mouth once daily. Historical Provider, MD Taking Active   Ozempic 1 mg/dose (4 mg/3 mL) pen injector [id number] No Inject 1 mg under the skin 1 (one) time per week. Historical Provider, MD Taking Active   traZODone (Desyrel) 50 mg tablet [id number]  Take 1 tablet (50 mg) by mouth once daily at bedtime. Historical Provider, MD  Active   venlafaxine XR (Effexor-XR) 150 mg 24 hr capsule [id number] No Take 1 capsule (150 mg) by mouth twice a day. Historical Provider, MD Taking Active                    No Known Allergies    Social History     Socioeconomic History   • Marital status:      Spouse name: Not on file   • Number of children: Not on file   • Years of education: Not on file   • Highest education level: Not on file   Occupational History   • Not on file   Tobacco Use   • Smoking status: Never   • Smokeless tobacco: Never   Substance and Sexual Activity   • Alcohol use: Not Currently   • Drug use: Never   • Sexual activity: Not on file   Other Topics Concern   • Not on file   Social History Narrative   • Not on file     Social Drivers of Health     Financial Resource Strain: Not on file   Food Insecurity: Not on file   Transportation Needs: Not on file   Physical Activity: Not on file   Stress: Not on file   Social Connections: Not on file   Intimate Partner Violence: Not on file   Housing Stability: Not on file       Past Surgical History:   Procedure Laterality Date   • OTHER SURGICAL HISTORY  2021    Nose surgery   • OTHER SURGICAL HISTORY  2021     section   • OTHER SURGICAL HISTORY  2021    Nephrectomy   • OTHER SURGICAL HISTORY  2021    Cholecystectomy laparoscopic   • OTHER SURGICAL HISTORY  2021    Salpingo-oophorectomy bilateral       There is no height or weight on file to calculate BMI.    HgA1c:   No results found for: \"HGBA1C\", " "\"[id number]\"    Physical Exam:    Constitutional: Well-developed well-nourished   Eyes: Sclerae anicteric, pupils equal and round  HENT: Normocephalic atraumatic  Cardiovascular: Pulses full, regular rate and rhythm  Respiratory: Breathing not labored, no wheezing  Integumentary: Skin intact, no lesions or rashes  Neurological: Sensation intact, no gross strength deficits, reflexes equal  Psychiatric: Alert oriented and appropriate  Hematologic/lymphatic: No lymphadenopathy  Left knee: Healing anterior abrasion no effusion tender medial joint line pause Tamara's full range of motion no instability          Imaging:  X-rays personally reviewed and are negative        Impression/Plan:  Knee contusion possible medial meniscus tear plan activity modification we discussed the natural history of degenerative meniscus tear as well as often resolve on their own she will follow-up in 4 weeks if she is still symptomatic meantime she will treat herself with periodic ice activity modification and anti-inflammatory medication  "

## 2024-12-28 ENCOUNTER — LAB (OUTPATIENT)
Dept: LAB | Facility: LAB | Age: 54
End: 2024-12-28
Payer: COMMERCIAL

## 2024-12-28 DIAGNOSIS — E21.2 OTHER HYPERPARATHYROIDISM (MULTI): Primary | ICD-10-CM

## 2024-12-28 LAB
CALCIUM (MG/L) IN 24 HOUR URINE: 204 MG/24H (ref 100–300)
CALCIUM 24H UR-MRATE: 12 MG/DL
COLLECT DURATION TIME SPEC: 24 HRS
COLLECT DURATION TIME SPEC: 24 HRS
CREAT 24H UR-MCNC: 77.7 MG/DL (ref 20–320)
CREAT 24H UR-MCNC: 77.8 MG/DL (ref 20–320)
CREAT 24H UR-MRATE: 1.32 G/24 H (ref 0.67–1.59)
CREAT 24H UR-MRATE: 1.32 G/24 H (ref 0.67–1.59)
SPECIMEN VOL 24H UR: 1700 ML
SPECIMEN VOL 24H UR: 1700 ML

## 2024-12-28 PROCEDURE — 82340 ASSAY OF CALCIUM IN URINE: CPT

## 2024-12-28 PROCEDURE — 81050 URINALYSIS VOLUME MEASURE: CPT

## 2024-12-28 PROCEDURE — 82570 ASSAY OF URINE CREATININE: CPT

## 2025-01-08 ENCOUNTER — TELEPHONE (OUTPATIENT)
Dept: ORTHOPEDIC SURGERY | Facility: CLINIC | Age: 55
End: 2025-01-08
Payer: COMMERCIAL

## 2025-01-25 LAB
NON-UH HIE A/G RATIO: 1
NON-UH HIE ALB: 3.5 G/DL (ref 3.4–5)
NON-UH HIE ALK PHOS: 126 UNIT/L (ref 45–117)
NON-UH HIE BILIRUBIN, TOTAL: 0.6 MG/DL (ref 0.3–1.2)
NON-UH HIE BUN/CREAT RATIO: 16.7
NON-UH HIE BUN: 20 MG/DL (ref 9–23)
NON-UH HIE CALCIUM, IONIZED: 1.32 MMOL/L (ref 1.12–1.32)
NON-UH HIE CALCIUM: 11.1 MG/DL (ref 8.7–10.4)
NON-UH HIE CALCULATED LDL CHOLESTEROL: 72 MG/DL (ref 60–130)
NON-UH HIE CALCULATED OSMOLALITY: 280 MOSM/KG (ref 275–295)
NON-UH HIE CHLORIDE: 104 MMOL/L (ref 98–107)
NON-UH HIE CHOLESTEROL: 154 MG/DL (ref 100–200)
NON-UH HIE CO2, VENOUS: 30 MMOL/L (ref 20–31)
NON-UH HIE CREATININE: 1.2 MG/DL (ref 0.5–0.8)
NON-UH HIE DIRECT LDL: 77 MG/DL
NON-UH HIE GFR AA: 57
NON-UH HIE GLOBULIN: 3.5 G/DL
NON-UH HIE GLOMERULAR FILTRATION RATE: 47 ML/MIN/1.73M?
NON-UH HIE GLUCOSE: 96 MG/DL (ref 74–106)
NON-UH HIE GOT: 19 UNIT/L (ref 15–37)
NON-UH HIE GPT: 21 UNIT/L (ref 10–49)
NON-UH HIE HDL CHOLESTEROL: 57 MG/DL (ref 40–60)
NON-UH HIE HGB A1C: 5.3 %
NON-UH HIE I-PTH: 109 PG/ML (ref 18.4–80.1)
NON-UH HIE K: 4.5 MMOL/L (ref 3.5–5.1)
NON-UH HIE NA: 139 MMOL/L (ref 135–145)
NON-UH HIE TOTAL CHOL/HDL CHOL RATIO: 2.7
NON-UH HIE TOTAL PROTEIN: 7 G/DL (ref 5.7–8.2)
NON-UH HIE TRIGLYCERIDES: 125 MG/DL (ref 30–150)
NON-UH HIE VIT D 25: 40 NG/ML

## 2025-01-30 ENCOUNTER — APPOINTMENT (OUTPATIENT)
Dept: NEPHROLOGY | Facility: CLINIC | Age: 55
End: 2025-01-30
Payer: COMMERCIAL

## 2025-01-31 ENCOUNTER — APPOINTMENT (OUTPATIENT)
Dept: RADIOLOGY | Facility: CLINIC | Age: 55
End: 2025-01-31
Payer: COMMERCIAL

## 2025-02-04 ENCOUNTER — HOSPITAL ENCOUNTER (OUTPATIENT)
Dept: RADIOLOGY | Facility: CLINIC | Age: 55
Discharge: HOME | End: 2025-02-04
Payer: COMMERCIAL

## 2025-02-04 ENCOUNTER — APPOINTMENT (OUTPATIENT)
Dept: NEPHROLOGY | Facility: CLINIC | Age: 55
End: 2025-02-04
Payer: COMMERCIAL

## 2025-02-04 DIAGNOSIS — M25.562 ACUTE PAIN OF LEFT KNEE: ICD-10-CM

## 2025-02-04 PROCEDURE — 73721 MRI JNT OF LWR EXTRE W/O DYE: CPT | Mod: LT

## 2025-02-04 PROCEDURE — 73721 MRI JNT OF LWR EXTRE W/O DYE: CPT | Mod: LEFT SIDE | Performed by: RADIOLOGY

## 2025-02-11 ENCOUNTER — TELEPHONE (OUTPATIENT)
Dept: ORTHOPEDIC SURGERY | Facility: CLINIC | Age: 55
End: 2025-02-11
Payer: COMMERCIAL

## 2025-02-11 NOTE — TELEPHONE ENCOUNTER
Patient called in and stated that she received a voice mail that she can go to PT for her left knee if needed.  The patient is asking for a referral to PT for her left knee.

## 2025-02-12 ENCOUNTER — OFFICE VISIT (OUTPATIENT)
Dept: PRIMARY CARE | Facility: CLINIC | Age: 55
End: 2025-02-12
Payer: COMMERCIAL

## 2025-02-12 VITALS
HEART RATE: 90 BPM | BODY MASS INDEX: 37.77 KG/M2 | OXYGEN SATURATION: 94 % | DIASTOLIC BLOOD PRESSURE: 70 MMHG | SYSTOLIC BLOOD PRESSURE: 120 MMHG | TEMPERATURE: 97.7 F | HEIGHT: 66 IN | WEIGHT: 235 LBS

## 2025-02-12 DIAGNOSIS — J02.9 PHARYNGITIS, UNSPECIFIED ETIOLOGY: ICD-10-CM

## 2025-02-12 DIAGNOSIS — E21.0 PRIMARY HYPERPARATHYROIDISM (MULTI): ICD-10-CM

## 2025-02-12 DIAGNOSIS — J01.10 ACUTE NON-RECURRENT FRONTAL SINUSITIS: ICD-10-CM

## 2025-02-12 DIAGNOSIS — R05.1 ACUTE COUGH: Primary | ICD-10-CM

## 2025-02-12 LAB
POC RAPID INFLUENZA A: NEGATIVE
POC RAPID INFLUENZA B: NEGATIVE
POC RAPID STREP: NEGATIVE
POC SARS-COV-2 AG BINAX: NORMAL

## 2025-02-12 PROCEDURE — 3008F BODY MASS INDEX DOCD: CPT | Performed by: INTERNAL MEDICINE

## 2025-02-12 PROCEDURE — 87804 INFLUENZA ASSAY W/OPTIC: CPT | Performed by: INTERNAL MEDICINE

## 2025-02-12 PROCEDURE — 87811 SARS-COV-2 COVID19 W/OPTIC: CPT | Performed by: INTERNAL MEDICINE

## 2025-02-12 PROCEDURE — 1036F TOBACCO NON-USER: CPT | Performed by: INTERNAL MEDICINE

## 2025-02-12 PROCEDURE — 99213 OFFICE O/P EST LOW 20 MIN: CPT | Performed by: INTERNAL MEDICINE

## 2025-02-12 PROCEDURE — 87880 STREP A ASSAY W/OPTIC: CPT | Performed by: INTERNAL MEDICINE

## 2025-02-12 RX ORDER — GABAPENTIN 300 MG/1
300 CAPSULE ORAL DAILY
COMMUNITY
Start: 2025-02-12

## 2025-02-12 RX ORDER — AZITHROMYCIN 250 MG/1
TABLET, FILM COATED ORAL
Qty: 6 TABLET | Refills: 0 | Status: SHIPPED | OUTPATIENT
Start: 2025-02-12

## 2025-02-12 RX ORDER — FLUTICASONE PROPIONATE 50 MCG
1 SPRAY, SUSPENSION (ML) NASAL DAILY
Qty: 16 G | Refills: 11 | Status: SHIPPED | OUTPATIENT
Start: 2025-02-12 | End: 2026-02-12

## 2025-02-12 RX ORDER — PROPRANOLOL HYDROCHLORIDE 20 MG/1
1 TABLET ORAL
COMMUNITY
Start: 2024-12-25

## 2025-02-12 ASSESSMENT — PATIENT HEALTH QUESTIONNAIRE - PHQ9
1. LITTLE INTEREST OR PLEASURE IN DOING THINGS: NOT AT ALL
2. FEELING DOWN, DEPRESSED OR HOPELESS: NOT AT ALL
SUM OF ALL RESPONSES TO PHQ9 QUESTIONS 1 & 2: 0

## 2025-02-12 ASSESSMENT — PAIN SCALES - GENERAL: PAINLEVEL_OUTOF10: 5

## 2025-02-12 NOTE — PROGRESS NOTES
"Chief Complaint   Patient presents with    Sore Throat    Cough    Nasal Congestion    chest congestion    Headache    Facial Pain     Pt here with c/o chest congestion, sore throat, runny nose, headache cough and facial pain.  Onset 3 days     54 year old woman  Last seen 09/2024.   Had renal cancer right side.  Dr Ocampo         Undergoing work up by Dr Bautista (son)  for hyperparathyroidism. Told primary hyperparathyroidism. Recommended surgery-has appt with Dr Blanca. Mom had surgery-she thinks parathyroid and had problem with swelling and required feeding tube. She does not have the records so unclear history. She indicates her mother used to see DR Bautista (the father) and she will check with him if he recalls the details    She started feeling sick on 02/10/2025. Tickle throat. Now body aches just started in the last hour. No nausea, vomiting, or diarrhea. No fever/chills/night sweats. Has chest congestion and sinus congestion. Sore throat. Headache.           Past medical history  Euthyroid, previously hyperthyroid. Treated with radioactive iodine November 2018 Dr. Bautista  Diverticulosis, colonoscopy August 2018 Dr. Parkinson  EGD August 2018 esophagitis, gastritis  Sleep apnea CPAP 8 per sleep study. Does not recall settings. Dr. Reyes  Vitamin D deficiency  Depression anxiety nurse practitioner [facility] behavioral health counseling [place]  Varicose veins  Renal carcinoma, right status post nephrectomy. Dr. Rea October 2021, Dr Ocampo  Laparoscopic cholecystectomy October 2021 Dr Vargas  BSO 10/2021 Dr ROULA Tate.   primary hyperparathyroidism. Dr Bautista    Social history. Works as a teacher. Non-smoker. . 2 sons     Exam  Blood pressure 120/70, pulse 90, temperature 36.5 °C (97.7 °F), height 1.676 m (5' 6\"), weight 107 kg (235 lb), last menstrual period 10/19/2021, SpO2 94%.  Body mass index is 37.93 kg/m².  HEENT nc/at [facility]. PERRL  Ears: TM intact  Throat: no exudate. No erythema.   Vital " reviewed  CV: RRR S1 S2 normal. No murmur.   Lungs: CTA without wrr. Breath sounds symmetric  Abdomen: normoactive. Soft. No mass.   Extremities: no pretibial edema  Neuro: speech intact.        07/2024 MA, PTH, CMP, D, TSH, A1c, calcium     1/2022 tsh, cmp, lipid, t4, D, cbc.      November 2021 TSH, T4, CBC, BMP  TSH 0.85  Sodium 139 potassium 4.1 creatinine 1.2 GFR 46  White blood cell count 5.5 hemoglobin low 9.5 hematocrit 28.5 platelet 295     ASSESSMENT AND PLAN:   1. Acute cough (Primary)  - POCT BinaxNOW Covid-19 Ag Card manually resulted  - POCT Influenza A/B manually resulted  - azithromycin (Zithromax) 250 mg tablet; Take 2 tabs (500 mg) by mouth today, than 1 daily for 4 days.  Dispense: 6 tablet; Refill: 0    2. Pharyngitis, unspecified etiology  - POCT Rapid Strep A manually resulted    3. Acute non-recurrent frontal sinusitis  - fluticasone (Flonase) 50 mcg/actuation nasal spray; Administer 1 spray into each nostril once daily. Shake gently. Before first use, prime pump. After use, clean tip and replace cap.  Dispense: 16 g; Refill: 11    4. Primary hyperparathyroidism (Multi)  Has appt with surgeon.     Flu/covid/strep negative. Advised mucinex. Also saline nasal spray and flonase  Medication use discussed with patient including potential benefits and side effects. Patient verbalized understanding and agreed to proceed.     Will try zpack. Rtc if refractory.   She expressed appreciation for care.     Mammogram  06/2023   DEXA per Dr. Bautista  Pap 2/2022 due 2027  Colonoscopy August 2018 Dr Parkinson repeat 5-10 years.      Current Outpatient Medications on File Prior to Visit   Medication Sig Dispense Refill    atorvastatin (Lipitor) 20 mg tablet Take 1 tablet (20 mg) by mouth once daily.      buPROPion XL (Wellbutrin XL) 150 mg 24 hr tablet Take 1 tablet (150 mg) by mouth early in the morning..      busPIRone (Buspar) 10 mg tablet Take 2 tablets (20 mg) by mouth 2 times a day.      ferrous sulfate, 325 mg  ferrous sulfate, tablet Take 1 tablet (325 mg) by mouth 2 times a day before meals.      gabapentin (Neurontin) 300 mg capsule Take 1 capsule (300 mg) by mouth once daily.      hydrOXYzine HCL (Atarax) 25 mg tablet Take 1 tablet (25 mg) by mouth 2 times a day as needed for anxiety.      losartan (Cozaar) 50 mg tablet Take 1 tablet (50 mg) by mouth once daily.      metFORMIN (Glucophage) 500 mg tablet Take by mouth. TAKE ONE TABLET IN THE AM AND THREE TABLETS IN THE PM      multivitamin (Daily Multi-Vitamin) tablet Take 1 tablet by mouth once daily.      omeprazole (PriLOSEC) 40 mg DR capsule Take 2 capsules (80 mg) by mouth once daily.      Ozempic 1 mg/dose (4 mg/3 mL) pen injector Inject 1 mg under the skin 1 (one) time per week.      propranolol (Inderal) 20 mg tablet Take 1 tablet (20 mg) by mouth every 12 hours.      traZODone (Desyrel) 50 mg tablet Take 1 tablet (50 mg) by mouth once daily at bedtime.      venlafaxine XR (Effexor-XR) 150 mg 24 hr capsule Take 1 capsule (150 mg) by mouth twice a day.      [DISCONTINUED] fluticasone (Flonase) 50 mcg/actuation nasal spray Administer 1 spray into each nostril once daily. Shake gently. Before first use, prime pump. After use, clean tip and replace cap. (Patient not taking: Reported on 2/12/2025) 16 g 11    [DISCONTINUED] gabapentin (Neurontin) 400 mg capsule Take 1 capsule (400 mg) by mouth once daily. (Patient not taking: Reported on 2/12/2025)       No current facility-administered medications on file prior to visit.

## 2025-02-17 ENCOUNTER — APPOINTMENT (OUTPATIENT)
Dept: SURGERY | Facility: CLINIC | Age: 55
End: 2025-02-17
Payer: COMMERCIAL

## 2025-03-26 ENCOUNTER — APPOINTMENT (OUTPATIENT)
Dept: SURGERY | Facility: CLINIC | Age: 55
End: 2025-03-26
Payer: COMMERCIAL

## 2025-03-26 VITALS
HEART RATE: 69 BPM | WEIGHT: 226 LBS | BODY MASS INDEX: 36.48 KG/M2 | SYSTOLIC BLOOD PRESSURE: 119 MMHG | DIASTOLIC BLOOD PRESSURE: 82 MMHG | RESPIRATION RATE: 18 BRPM

## 2025-03-26 DIAGNOSIS — E21.0 PRIMARY HYPERPARATHYROIDISM (MULTI): ICD-10-CM

## 2025-03-26 PROCEDURE — 99205 OFFICE O/P NEW HI 60 MIN: CPT | Performed by: SURGERY

## 2025-03-26 ASSESSMENT — ENCOUNTER SYMPTOMS
ENDOCRINE NEGATIVE: 1
PSYCHIATRIC NEGATIVE: 1
RESPIRATORY NEGATIVE: 1
MUSCULOSKELETAL NEGATIVE: 1
CARDIOVASCULAR NEGATIVE: 1
NEUROLOGICAL NEGATIVE: 1
CONSTITUTIONAL NEGATIVE: 1
EYES NEGATIVE: 1

## 2025-03-26 ASSESSMENT — PAIN SCALES - GENERAL: PAINLEVEL_OUTOF10: 0-NO PAIN

## 2025-03-26 NOTE — PROGRESS NOTES
Subjective   Patient ID: Park Nicole is a 54 y.o. female who presents for surgical consultation for primary hyperparathyroidism.    HPI I saw Mrs. Nicole in surgery clinic today.  She was referred by Dr. Alok Bautista of medical endocrinology for surgical consultation for primary hyperparathyroidism.  Patient has had laboratory studies documenting elevated calcium levels ranging from high normal at 10.4 up to a maximum of 11.1.  Parathyroid hormone level on 2 separate occasions is elevated ranging 109-115 confirming a diagnosis of mild primary hyperparathyroidism.  Her ionized calcium level is normal at 1.32.  Vitamin D normal at 40.  She did also have a 24-hour urine calcium done that was normal at 204.    She does also have a history of nodular thyroid disease.  She had ultrasound-guided biopsies of thyroid nodules on both the right and left sides done in April 2018.  At that time she had bilateral benign nodules consistent with a multinodular goiter.    Her last thyroid ultrasound was done in July 2024.  Right sided TI-RADS 4 nodule remains stable at 2.4 cm previously 2.5 cm.  Left sided TI-RADS 5 nodule 1.5 cm in size decreased down to 1.1 cm.  All other nodules were also stable.  She is clinically and biochemically euthyroid with a TSH of 1.49.    In terms of symptoms associated with her hyperparathyroidism, from a renal standpoint-as mentioned her 24-hour urine calcium was normal.  No history of kidney stones.  Bone-no recent fractures.  I did not see a DEXA bone density test for her.  GI-no peptic ulcer disease, no pancreatitis.  Neurocognitive-energy levels are reasonable.  No depression.  Occasional mental fogginess.    She denies any neck pain no difficulty breathing or swallowing no troubles with her voice.  No personal history of head neck or chest radiation exposure.    Family history she thinks her grandmother may have had thyroid nodules but no history of hyperparathyroidism.    Review of Systems    Constitutional: Negative.    HENT: Negative.     Eyes: Negative.    Respiratory: Negative.     Cardiovascular: Negative.    Endocrine: Negative.    Musculoskeletal: Negative.    Neurological: Negative.    Psychiatric/Behavioral: Negative.         Objective   Physical Exam  Vitals reviewed.   Constitutional:       Appearance: Normal appearance. She is obese.   Eyes:      Comments: No proptosis   Neck:      Vascular: No carotid bruit.      Comments: Thyroid feels normal.  I cannot palpate any discrete nodules.  Trachea midline  Cardiovascular:      Rate and Rhythm: Normal rate and regular rhythm.      Heart sounds: Normal heart sounds.   Pulmonary:      Effort: Pulmonary effort is normal. No respiratory distress.      Breath sounds: Normal breath sounds. No wheezing or rales.   Musculoskeletal:         General: Normal range of motion.   Lymphadenopathy:      Cervical: No cervical adenopathy.   Skin:     General: Skin is warm.   Neurological:      General: No focal deficit present.      Mental Status: She is alert and oriented to person, place, and time.   Psychiatric:         Mood and Affect: Mood normal.         Behavior: Behavior normal.         US THYROID     CLINICAL INDICATIONS:  Follow up thyroid nodules..       COMPARISON:  Thyroid ultrasound 2/28/2022.     TECHNIQUE:  Grayscale and color Doppler ultrasound scanning of the thyroid gland performed with a high frequency linear transducer.     FINDINGS:     Size right lobe: 6.0 x 2.1 x 1.9 cm (craniocaudal x transverse x anterior-posterior)   Size left lobe: 5.2 x 2.2 x 1.8 cm (craniocaudal x transverse x anterior-posterior)   Size isthmus: 0.4 cm (anterior-posterior)     Nodule 1:   Size: 1.0 x 0.6 x 0.5 cm   Location: Right thyroid lobe; Upper.     Composition: Solid (2)   Echogenicity: Hypoechoic (2)   Shape: Wider than tall (0)   Margins: Smooth (0)   Echogenic foci: None     ACR TI-RADS total points: 4   ACR TI-RADS risk category: TR4     Nodule 2:   Size:  2.4 x 2.2 x 1.6 cm (previously measuring 2.5 x 2.4 x 1.7 cm).   Location: Right thyroid lobe; Lower.     Composition: Solid (2)   Echogenicity: Hypoechoic (2)   Shape: Wider than tall (0)   Margins: Ill-defined (0)   Echogenic foci: None     ACR TI-RADS total points: 4   ACR TI-RADS risk category: TR4     Prior biopsy: Yes   Significant growth: No   Change in features: No   Change in ACR TI-RADS risk category: No     Nodule 3:   Size: 1.1 x 0.9 x 0.7 cm (formerly measuring 1.5 x 0.8 x 1.1 cm).   Location: Left thyroid lobe; Upper.     Composition: Solid (2)   Echogenicity: Hypoechoic (2)   Shape: Taller-than-wide (3) (   Margins: Smooth (0)   Echogenic foci: None     ACR TI-RADS total points: 7   ACR TI-RADS risk category: TR 5     Prior biopsy: Yes   Significant growth: No   Change in features: Yes Taller than wide.   Change in ACR TI-RADS risk category: Yes elevated categorization. ____________________     IMPRESSION:     1. Nodule 1: ACR TI-RADS TR 4. Recommend: Follow-up     2.  Nodule 2: ACR TI-RADS TR 4. Recommend: Correlate with prior biopsy to determine need for follow-up.     3.  Nodule 3: ACR TI-RADS TR 5. Recommend: FNA     4.  Nodule : ACR TI-RADS . Recommend:     Assessment/Plan    Mrs. Nicole has biochemical evidence of mild primary hyperparathyroidism.  She has had calcium levels ranging from 10.4 (which was high normal) up to a maximum of 11.1.  With that, her parathyroid hormone level has been mildly elevated on 2 occasions ranging 109-115 with 80 being the upper end of normal.    She does not have any symptomatology associated with her hyperparathyroidism.  No fractures.  She stated she did have a DEXA bone density test done in 2024 which were trying to get results on.  Renal-no kidney stones.  24-hour urine calcium levels were normal.  We did discuss that she had a history of a right renal cell cancer and has had a right nephrectomy.  Therefore she does only have 1 kidney.  This might push us a  bit more to say that she could benefit from surgery to avoid any potential risk of damage to her left kidney.  Her GFR is still normal.    Once we have further information on her DEXA bone density imaging, we can have further discussion about surgery.  I did review the operation and recovery as well as risks and benefits with her and her  today.    If we move toward surgery, we would need to do a sestamibi scan and a neck ultrasound to look for possible parathyroid adenoma target.    She is comfortable with this plan.         Edvin Blanca MD 03/26/25 10:03 AM

## 2025-04-19 ENCOUNTER — HOSPITAL ENCOUNTER (OUTPATIENT)
Dept: RADIOLOGY | Facility: CLINIC | Age: 55
Discharge: HOME | End: 2025-04-19
Payer: COMMERCIAL

## 2025-04-19 DIAGNOSIS — E21.0 PRIMARY HYPERPARATHYROIDISM (MULTI): ICD-10-CM

## 2025-04-19 PROCEDURE — 77080 DXA BONE DENSITY AXIAL: CPT

## 2025-04-19 PROCEDURE — 77081 DXA BONE DENSITY APPENDICULR: CPT | Performed by: RADIOLOGY

## 2025-04-28 DIAGNOSIS — E21.0 PRIMARY HYPERPARATHYROIDISM (MULTI): ICD-10-CM

## 2025-04-28 LAB
NON-UH HIE ALB: 3.4 G/DL (ref 3.4–5)
NON-UH HIE BUN/CREAT RATIO: 13.3
NON-UH HIE BUN: 16 MG/DL (ref 9–23)
NON-UH HIE CALCIUM: 10.6 MG/DL (ref 8.7–10.4)
NON-UH HIE CALCULATED OSMOLALITY: 286 MOSM/KG (ref 275–295)
NON-UH HIE CHLORIDE: 102 MMOL/L (ref 98–107)
NON-UH HIE CO2, VENOUS: 31 MMOL/L (ref 20–31)
NON-UH HIE CORRECTED CALCIUM: 11 MG/DL (ref 8.5–10.5)
NON-UH HIE CREATININE, URINE MG/DL: 61.8 MG/DL
NON-UH HIE CREATININE: 1.2 MG/DL (ref 0.5–0.8)
NON-UH HIE GFR AA: 57
NON-UH HIE GLOMERULAR FILTRATION RATE: 47 ML/MIN/1.73M?
NON-UH HIE GLUCOSE: 90 MG/DL (ref 74–106)
NON-UH HIE K: 4.3 MMOL/L (ref 3.5–5.1)
NON-UH HIE MICROALBUMIN, URINE MG/L: <3 MG/L
NON-UH HIE MICROALBUMIN/CREATININE RATIO: <5 MG MALB/GM CREAT (ref 0–30)
NON-UH HIE NA: 143 MMOL/L (ref 135–145)
NON-UH HIE PHOSPHORUS: 3.1 MG/DL (ref 2–5.1)
NON-UH HIE URIC ACID: 5.6 MG/DL (ref 3.1–7.8)

## 2025-05-08 ENCOUNTER — HOSPITAL ENCOUNTER (OUTPATIENT)
Dept: RADIOLOGY | Facility: HOSPITAL | Age: 55
Discharge: HOME | End: 2025-05-08
Payer: COMMERCIAL

## 2025-05-08 DIAGNOSIS — E21.0 PRIMARY HYPERPARATHYROIDISM (MULTI): ICD-10-CM

## 2025-05-08 PROCEDURE — 78072 PARATHYRD PLANAR W/SPECT&CT: CPT

## 2025-05-08 PROCEDURE — A9500 TC99M SESTAMIBI: HCPCS | Performed by: SURGERY

## 2025-05-08 PROCEDURE — 3430000001 HC RX 343 DIAGNOSTIC RADIOPHARMACEUTICALS: Performed by: SURGERY

## 2025-05-08 RX ORDER — TETRAKIS(2-METHOXYISOBUTYLISOCYANIDE)COPPER(I) TETRAFLUOROBORATE 1 MG/ML
21.3 INJECTION, POWDER, LYOPHILIZED, FOR SOLUTION INTRAVENOUS
Status: COMPLETED | OUTPATIENT
Start: 2025-05-08 | End: 2025-05-08

## 2025-05-08 RX ADMIN — TECHNETIUM TC 99M SESTAMIBI 21.3 MILLICURIE: 1 INJECTION INTRAVENOUS at 10:25

## 2025-05-10 LAB
NON-UH HIE A/G RATIO: 1
NON-UH HIE ALB: 3.8 G/DL (ref 3.4–5)
NON-UH HIE ALK PHOS: 125 UNIT/L (ref 45–117)
NON-UH HIE BILIRUBIN, TOTAL: 0.5 MG/DL (ref 0.3–1.2)
NON-UH HIE BUN/CREAT RATIO: 10.8
NON-UH HIE BUN: 13 MG/DL (ref 9–23)
NON-UH HIE CALCIUM, IONIZED: 1.38 MMOL/L (ref 1.12–1.32)
NON-UH HIE CALCIUM: 11.1 MG/DL (ref 8.7–10.4)
NON-UH HIE CALCULATED OSMOLALITY: 277 MOSM/KG (ref 275–295)
NON-UH HIE CHLORIDE: 102 MMOL/L (ref 98–107)
NON-UH HIE CO2, VENOUS: 30 MMOL/L (ref 20–31)
NON-UH HIE CREATININE: 1.2 MG/DL (ref 0.5–0.8)
NON-UH HIE GFR AA: 57
NON-UH HIE GLOBULIN: 3.8 G/DL
NON-UH HIE GLOMERULAR FILTRATION RATE: 47 ML/MIN/1.73M?
NON-UH HIE GLUCOSE: 86 MG/DL (ref 74–106)
NON-UH HIE GOT: 19 UNIT/L (ref 15–37)
NON-UH HIE GPT: 18 UNIT/L (ref 10–49)
NON-UH HIE HGB A1C: 5.2 %
NON-UH HIE I-PTH: 113 PG/ML (ref 18.4–80.1)
NON-UH HIE K: 4.2 MMOL/L (ref 3.5–5.1)
NON-UH HIE NA: 139 MMOL/L (ref 135–145)
NON-UH HIE TOTAL PROTEIN: 7.6 G/DL (ref 5.7–8.2)
NON-UH HIE VIT D 25: 45 NG/ML

## 2025-05-22 ENCOUNTER — OFFICE VISIT (OUTPATIENT)
Dept: SURGERY | Facility: CLINIC | Age: 55
End: 2025-05-22
Payer: COMMERCIAL

## 2025-05-22 VITALS
WEIGHT: 224 LBS | SYSTOLIC BLOOD PRESSURE: 108 MMHG | DIASTOLIC BLOOD PRESSURE: 76 MMHG | HEART RATE: 78 BPM | OXYGEN SATURATION: 94 % | BODY MASS INDEX: 36.15 KG/M2

## 2025-05-22 DIAGNOSIS — E21.0 PRIMARY HYPERPARATHYROIDISM (MULTI): Primary | ICD-10-CM

## 2025-05-22 DIAGNOSIS — Z00.00 HEALTHCARE MAINTENANCE: ICD-10-CM

## 2025-05-22 DIAGNOSIS — Z01.818 PREOPERATIVE TESTING: ICD-10-CM

## 2025-05-22 PROCEDURE — 99215 OFFICE O/P EST HI 40 MIN: CPT | Mod: 25 | Performed by: SURGERY

## 2025-05-22 NOTE — PROGRESS NOTES
Subjective   Patient ID: Park Nicole is a 55 y.o. female who presents for follow-up visit regarding her primary hyperparathyroidism.    HPI I saw Mrs. Nicole in surgery clinic today.  She has a known history of primary hyperparathyroidism.  Prior to her coming today she did get some follow-up lab work with her endocrinologist on  5/10/2025.  Her calcium still remains elevated at 11.1, vitamin D was normal at 45 and parathyroid hormone level remains high at 113.  All of this is consistent with primary hyperparathyroidism.    She did undergo a DEXA and bone density test.   Bilateral distal radius which were both normal.  This is the area of bone most likely to be affected by parathyroid hormone.  It does not appear that they check her lumbar spine or her femurs.    She had a sestamibi scan completed which did show an area of increased uptake behind the right thyroid lobe.  Radiology felt that this could represent a parathyroid adenoma.  Therefore I had her come to the office today to do an ultrasound of that area.    She denies any changes in her neck and is otherwise no changes since I last saw her in her general medical history since March.    Review of Systems    Objective   Physical Exam  Vitals reviewed.   Constitutional:       Appearance: Normal appearance.   Neurological:      Mental Status: She is alert.       Patient ID: Park Nicole is a 55 y.o. female.    General    Date/Time: 5/22/2025 11:59 AM    Performed by: Edvin Blanca MD  Authorized by: Edvin Blanca MD    Consent:     Consent obtained:  Verbal    Consent given by:  Patient    Risks, benefits, and alternatives were discussed: yes      Alternatives discussed:  No treatment and observation  Procedure specific details:      Neck ultrasound    In the office I did a neck ultrasound with a 6-15 MHz linear ultrasound probe to look for possible parathyroid adenoma in a patient with biochemical evidence of primary hyperparathyroidism.  I focus  initially on the right side as this was the area that radiology felt she could have a parathyroid on her sestamibi scan.  Right thyroid lobe does contain a 2.1 cm hypoechoic nodule smooth bordered.  TI-RADS 4.  Sitting directly behind it is a hypoechoic extrathyroidal mass smooth bordered 1 cm in length consistent with a right inferior parathyroid adenoma.  This is seen in both transverse and sagittal views and has a classic vascular arc associated with it.  Left lobe contains a 1.1 x 0.4 cm TI-RADS 4 nodule.    Images were captured and reviewed with the patient.  Post-procedure details:     Procedure completion:  Tolerated        Assessment/Plan    Mrs. Nicole has biochemical evidence of primary hyperparathyroidism with persistently elevated calcium around 11.1 parathyroid hormone level 113.  Her sestamibi scan and ultrasound in the office today are both consistent with a right sided parathyroid adenoma.  I think she would be a good candidate for resection.    She does have some bilateral thyroid nodules.  I need to confirm with her endocrinologist the biopsies on these.  According to my notes it looks like she had biopsies of these around 2018 bilaterally which were benign.  I like to also see what her last ultrasound look like in terms of size of these nodules.  That could determine if we would just do the parathyroidectomy or if we would want to consider resection of any of her thyroid at the time of operation.    She is comfortable with this plan.  I told her I will get back in touch with her once I talk to her endocrinologist and we can work on scheduling a surgery date for at least parathyroidectomy plus minus any of her thyroid based on further discussions.         Edvin Blanca MD 05/22/25 11:55 AM

## 2025-06-02 ENCOUNTER — OFFICE VISIT (OUTPATIENT)
Dept: NEUROLOGY | Facility: CLINIC | Age: 55
End: 2025-06-02
Payer: COMMERCIAL

## 2025-06-02 VITALS
BODY MASS INDEX: 39.21 KG/M2 | DIASTOLIC BLOOD PRESSURE: 77 MMHG | SYSTOLIC BLOOD PRESSURE: 111 MMHG | HEIGHT: 66 IN | WEIGHT: 244 LBS | HEART RATE: 80 BPM

## 2025-06-02 DIAGNOSIS — R25.1 PHYSIOLOGICAL TREMOR: ICD-10-CM

## 2025-06-02 DIAGNOSIS — G31.84 MILD COGNITIVE IMPAIRMENT: ICD-10-CM

## 2025-06-02 DIAGNOSIS — R26.89 BALANCE PROBLEMS: Primary | ICD-10-CM

## 2025-06-02 PROCEDURE — 99213 OFFICE O/P EST LOW 20 MIN: CPT | Mod: GC | Performed by: PSYCHIATRY & NEUROLOGY

## 2025-06-02 PROCEDURE — 3008F BODY MASS INDEX DOCD: CPT | Performed by: PSYCHIATRY & NEUROLOGY

## 2025-06-02 PROCEDURE — 99213 OFFICE O/P EST LOW 20 MIN: CPT | Performed by: PSYCHIATRY & NEUROLOGY

## 2025-06-02 PROCEDURE — 1036F TOBACCO NON-USER: CPT | Performed by: PSYCHIATRY & NEUROLOGY

## 2025-06-02 ASSESSMENT — UNIFIED PARKINSONS DISEASE RATING SCALE (UPDRS)
RIGIDITY_NECK: 0
LEG_AGILITY_LEFT: 0
POSTURAL_TREMOR_RIGHTHAND: 1
TOETAPPING_LEFT: 1
SPONTANEITY_OF_MOVEMENT: 0
RIGIDITY_LLE: 0
SPEECH: 0
AMPLITUDE_LIP_JAW: 0
RIGIDITY_RUE: 0
AMPLITUDE_LLE: 0
POSTURAL_STABILITY: 2
FINGER_TAPPING_LEFT: 1
FREEZING_GAIT: 0
AMPLITUDE_RLE: 0
AMPLITUDE_LUE: 0
FINGER_TAPPING_RIGHT: 0
AMPLITUDE_RUE: 0
CONSTANCY_TREMOR_ATREST: 0
FACIAL_EXPRESSION: 0
LEG_AGILITY_RIGHT: 0
TOTAL_SCORE: 7
PRONATION_SUPINATION_RIGHT: 0
KINETIC_TREMOR_RIGHTHAND: 0
PARKINSONS_MEDS: NO
RIGIDITY_RLE: 0
DYSKINESIAS_PRESENT: NO
CHAIR_RISING_SCALE: 0
GAIT: 0
HANDMOVEMENTS_RIGHT: 0
POSTURE: 0
TOETAPPING_RIGHT: 1
KINETIC_TREMOR_LEFTHAND: 0
POSTURAL_TREMOR_LEFTHAND: 1
LEVODOPA: NO
PRONATION_SUPINATION_LEFT: 0
RIGIDITY_LUE: 0

## 2025-06-02 ASSESSMENT — ENCOUNTER SYMPTOMS
OCCASIONAL FEELINGS OF UNSTEADINESS: 0
LOSS OF SENSATION IN FEET: 0
DEPRESSION: 0

## 2025-06-02 ASSESSMENT — PATIENT HEALTH QUESTIONNAIRE - PHQ9
SUM OF ALL RESPONSES TO PHQ9 QUESTIONS 1 & 2: 0
1. LITTLE INTEREST OR PLEASURE IN DOING THINGS: NOT AT ALL
SUM OF ALL RESPONSES TO PHQ9 QUESTIONS 1 AND 2: 0
2. FEELING DOWN, DEPRESSED OR HOPELESS: NOT AT ALL
1. LITTLE INTEREST OR PLEASURE IN DOING THINGS: NOT AT ALL
2. FEELING DOWN, DEPRESSED OR HOPELESS: NOT AT ALL

## 2025-06-02 NOTE — LETTER
"June 2, 2025     Britta Siddiqui MD  [id number]   [facility] [address]    Patient: Park Nicole   YOB: 1970   Date of Visit: 6/2/2025       Dear Dr. Britta Siddiqui MD:    Thank you for referring Park Nicole to me for evaluation. Below are my notes for this consultation.  If you have questions, please do not hesitate to call me. I look forward to following your patient along with you.       Sincerely,     Staci Tavares MD      CC: No Recipients  ______________________________________________________________________________________    Subjective  Park Nicole is a 55 y.o. year old female with PMHx RCC s/p R nephrectomy (2021), hyperthyroidism, ROSALBA, PLMD, anxiety, depression who presents for tremor evaluation.    Interval Hx:  First evaluated 11/25/24 for mild fine tremor most consistent with enhanced vs ET. Also Noted to have MCI (MoCA 23/30). For these, MRI brain and blood work for reversible causes of cognitive changes were ordered. Also referred for neuropsych testing.    Patient reports she has appointment for NP at the end of this month. She was diagnosed with primary hyperparathyroidism and is being evaluated for potential surgery. The tremors still occur periodically and have not worsened. No impairment in function. We discussed that the brain MRI did not show anything concerning. TSH and B12 were wnl.    No RBD, has constipation in past 2-3 years (possibly related to hyperparathyroidism), no consistent OH symptom, balance is \"a little off\". Had benefit from PT. Reports no significant  changes in memory since last visit.    Prior Hx:  Per chart review, tremors started in 2022: around the same time, patient started on Wellbutrin for worsening anxiety. At the time, Wellbutrin was working well for anxiety but patient noticed \"new onset tremors.\" Most recently followed with Neurology NP at [facility] in 3/2024: patient was improving on amantadine. No recent TSH available for review; " "Endocrinology following (last seen 9/2024).    Per patient, tremors started during adolescence, right more than left. Tremors continue to be confined to BUE but have worsened (more pronounced) over the past 10 years. Most noticeable with tasks (e.g. photography), but able to write, button clothes and use utensils. Has not interfered with social activities. Patient denied benefits with propranolol, biotin; patient does not remember effects of amantadine (she stopped taking the medication a few months prior). Tremors have slightly worsened over past 6 months. Patient denied freezing but described \"wobbly legs\" without falls. Denied anosmia, RBD, orthostasis, AVH. Reported constipation in past 4 months, that improved with hydration and stool softener.     Stopped Strattera in 10/2024. Restarted Wellbutrin in 9/2024 (patient denied taking it for \"several years\"). Started trazodone 2-3 months ago. Has been taking buspirone and venlafaxine at current dose for several years.    Patient also reported cognitive slowing and memory difficulties (e.g. trouble recalling names of students) in past few months. Sleeps 7 hours per night, usually feels well-rested upon waking. Consistent CPAP use. Denied current mood dysregulation but endorsed significant emotional stressors a few months ago. Patient drinks one cup of coffee in AM; sometimes has coffee or caffeinated soda in PM.    Denied family history of tremors or neurodegenerative conditions.    Working as teacher. Never smoker. Rarely uses alcohol; reported minimal improvement of tremors with alcohol. Denied illicit substance use.     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)   • Malignant neoplasm of right kidney, except renal pelvis   • Sleep apnea   • Tremor   • Valgus deformity of foot   • Varicose veins of lower extremity   • Vitamin D deficiency   • Primary hyperparathyroidism (Multi)     Past Medical History:   Diagnosis Date   • Colon polyp    • Diverticulitis of colon    • Personal history of other diseases of the circulatory system     History of cardiac disorder   • Personal history of other diseases of the circulatory system     History of hypertension   • Personal history of other diseases of the musculoskeletal system and connective tissue     History of arthritis   • Personal history of other diseases of the respiratory system     History of asthma   • Thyroid nodule      Past Surgical History:   Procedure Laterality Date   •  SECTION, LOW TRANSVERSE   &    • OTHER SURGICAL HISTORY  2021    Nose surgery   • OTHER SURGICAL HISTORY  2021     section   • OTHER SURGICAL HISTORY  2021    Nephrectomy   • OTHER SURGICAL HISTORY  2021    Cholecystectomy laparoscopic   • OTHER SURGICAL HISTORY  2021    Salpingo-oophorectomy bilateral     Social History     Tobacco Use   • Smoking status: Never   • Smokeless tobacco: Never   Substance Use Topics   • Alcohol use: Not Currently     Alcohol/week: 1.0 standard drink of alcohol     Types: 1 Standard drinks or equivalent per week     Comment: One drink per month     family history includes Alcohol abuse in her brother and mother's sister; Arthritis in her father and mother; Blood clot in her father; COPD in her mother; Cancer in her father, paternal grandfather, and another family member; Colon cancer in her paternal grandfather; Depression in her mother; Diabetes in her mother; Drug abuse in her brother and sister; Fibromyalgia in her mother; Hearing loss in her father; Heart disease  in her paternal grandmother; Hernia in her sister; Hypertension in her father and another family member; Lymphoma in her father; Mental illness in her brother, mother's sister, and sister; Skin cancer in her mother; Stroke in her paternal grandmother; cardiac disorder in her mother.    Current Outpatient Medications:   •  atorvastatin (Lipitor) 20 mg tablet, Take 1 tablet (20 mg) by mouth once daily., Disp: , Rfl:   •  buPROPion XL (Wellbutrin XL) 150 mg 24 hr tablet, Take 1 tablet (150 mg) by mouth early in the morning.., Disp: , Rfl:   •  busPIRone (Buspar) 10 mg tablet, Take 2 tablets (20 mg) by mouth 2 times a day., Disp: , Rfl:   •  ferrous sulfate, 325 mg ferrous sulfate, tablet, Take 1 tablet by mouth 2 times a day before meals., Disp: , Rfl:   •  gabapentin (Neurontin) 300 mg capsule, Take 1 capsule (300 mg) by mouth once daily., Disp: , Rfl:   •  hydrOXYzine HCL (Atarax) 25 mg tablet, Take 1 tablet (25 mg) by mouth 2 times a day as needed for anxiety., Disp: , Rfl:   •  losartan (Cozaar) 50 mg tablet, Take 1 tablet (50 mg) by mouth once daily., Disp: , Rfl:   •  metFORMIN (Glucophage) 500 mg tablet, Take by mouth. TAKE ONE TABLET IN THE AM AND THREE TABLETS IN THE PM, Disp: , Rfl:   •  omeprazole (PriLOSEC) 40 mg DR capsule, Take 2 capsules (80 mg) by mouth once daily., Disp: , Rfl:   •  Ozempic 1 mg/dose (4 mg/3 mL) pen injector, Inject 1 mg under the skin 1 (one) time per week., Disp: , Rfl:   •  propranolol (Inderal) 20 mg tablet, Take 1 tablet (20 mg) by mouth every 12 hours., Disp: , Rfl:   •  venlafaxine XR (Effexor-XR) 150 mg 24 hr capsule, Take 1 capsule (150 mg) by mouth twice a day., Disp: , Rfl:   •  traZODone (Desyrel) 50 mg tablet, Take 1 tablet (50 mg) by mouth once daily at bedtime., Disp: , Rfl:   No Known Allergies    Objective  Vitals:    06/02/25 1518   BP: 111/77   Pulse: 80        Physical Exam  Neurological Exam:  MENTAL STATUS:  General appearance: alert, mildly anxious,  NAD  Orientation: person, place, time    MOCA  on 2024. 5/ visual spacial. 3/3 naming.  attention. 23 language. 2/2 abstraction.  recall.  orientation.     CRANIAL NERVES:  - II:  Visual fields intact to confrontation bilaterally  - III, IV, VI: EOMI to pursuit without nystagmus  - V: V1-V3 sensation intact bilaterally  - VII: Face muscles symmetric with smile and eye closure  - VIII: Intact to voice  - IX, X: Palate elevated symmetrically bilaterally, no hoarseness  - XI: 5/5 strength on shoulder shrugging bilaterally  - XII: Tongue midline without atrophy or fasciculation    MOTOR: Tone and bulk normal in all extremities; very mild, fine tremors in bilateral hands. Archimedes spirals and handwriting intact.    STRENGTH: R L  Deltoid  5 5  Biceps  5 5  Triceps  5 5    5 5    Hip flexion 5 5  Quadriceps 5 5  Hamstrings 5 5    REFLEXES:  R  L  Biceps   2+ 2+  Triceps   2+ 2+  Patellar   2+ 2+    COORDINATION: Intact on finger to nose bl, intact on heel to shin bl    SENSORY: grossly intact to light touch in bl UE and LE    GAIT: Normal standard gait     MDS UPDRS 1st Score:   Motor Examination  Is the patient on medication for treating the symptoms of Parkinson's Disease?: No  Is the patient on Levodopa?: No  Speech: 0  Facial Expression: 0  Rigidty Neck: 0  Rigidty RUE: 0  Rigidity - LUE: 0  Rigidity RLE: 0  Rigidity LLE: 0  Finger Tapping Right Hand: 0  Finger Tapping Left Hand: 1  Hand Movements- Right Hand: 0  Hand Movements- Left Hand: 0  Pronatiaon-Supination Movments - Right Hand: 0  Pronatiaon-Supination Movments Left Hand: 0  Toe Tapping Right Foot: 1  Toe Tapping - Left Foot: 1  Leg Agility - Right Le  Leg Agility - Left le  Arising from Chair: 0  Gait: 0  Freezing of Gait: 0  Postural Stability: 2  Posture: 0  Global Spontanteity of Movment ( Body Bradykinesia): 0  Postural Tremor - Right Hand: 1  Postural Tremor - Left hand: 1  Kinetic Tremor - Right hand: 0  Kinetic  Tremor - Left hand: 0  Rest Tremor Amplitude - RUE: 0  Rest Tremor Amplitude - LUE: 0  Rest Tremor Amplitude - RLE: 0  Rest Tremor Amplitude - LLE: 0  Rest Tremor Amplitude - Lip/Jaw: 0  Constancy of Rest Tremor: 0  MDS UPDRS Total Score: 7  Were dyskinesias (chorea or dystonia) present during examination?: No      Assessment/Plan  Park Nicole is a 55 y.o. female with PMHx RCC s/p R nephrectomy (2021), hyperparathyroidism, ROSALBA, PLMD, anxiety, depression who  was first evaluated 11/25/24 for mild fine tremor most consistent with enhanced vs ET. Also Noted to have MCI (MoCA 23/30) and endorsed difficulties with balance. For these, MRI brain and blood work for reversible causes of cognitive changes were obtained with no pertinent finding.  She is scheduled to have NP testing at the end of this month.  Her balance did improve with PT but now reportedly has again worsened (no fall).  Again denies any PD prodrome (except for constipation that is likely related to hyperparathyroidism).  Very minimal tremor on exam today but did have some postural instability.    The following was discussed   - The MRI and blood work we did were all reassuring  - It is reassuring that your tremors have not worsened  - Regarding the balance issues, we have referred you for another round of physical therapy  - Keep your appointment with the neuropsychologist for memory testing. Please make sure to request a session with them to discuss the results after testing  - We will plan to see you in 1 year for follow up but of course can see you sooner if need be.      Severine L Kako, MD     I saw and evaluated the patient. I personally obtained the key and critical portions of the history and physical exam or was physically present for key and critical portions performed by the resident/fellow. I reviewed the resident/fellow's documentation and discussed the patient with the resident/fellow. I agree with the resident/fellow's medical decision  making as documented in the note.    Staci Tavares MD     For the Evaluation and Management of this patient, the level of Medical Decision Making for this visit was determined based on the following:    The level of COMPLEXITY AND NUMBER OF PROBLEMS ADDRESSED was , MODERATE,] as determined by:     MODERATE:    two or more stable chronic illnesses.      The AMOUNT/COMPLEXITY OF DATA TO REVIEW (reviewed, ordered or call for) was LIMITED] as determined by:      LIMITED:  my review of prior external notes from a unique source.      The level of RISK OF COMPLICATIONS was  LOW] as determined by:        LOW:  A low risk of morbidity from additional diagnostic testing or treatment.      Thus, the level of medical decision making (based on the lower of the two highest elements) was determined to be [LOW]. Therefore the appropriate E/M code for this encounter is [93022,        This is a chronic neurologic condition that requires ongoing care and monitoring. This is a complex, serious condition that needs long term care going forward. Between myself and the patient we will be changing direction of care depending on responses to treatment.   Today we discussed medication options, non medication options for management and various other symptoms that are in relation to this disease.  I will continue to be involved in the care of this patient.

## 2025-06-02 NOTE — PROGRESS NOTES
"Subjective   Park Nicole is a 55 y.o. year old female with PMHx RCC s/p R nephrectomy (2021), hyperthyroidism, ROSALBA, PLMD, anxiety, depression who presents for tremor evaluation.    Interval Hx:  First evaluated 11/25/24 for mild fine tremor most consistent with enhanced vs ET. Also Noted to have MCI (MoCA 23/30). For these, MRI brain and blood work for reversible causes of cognitive changes were ordered. Also referred for neuropsych testing.    Patient reports she has appointment for NP at the end of this month. She was diagnosed with primary hyperparathyroidism and is being evaluated for potential surgery. The tremors still occur periodically and have not worsened. No impairment in function. We discussed that the brain MRI did not show anything concerning. TSH and B12 were wnl.    No RBD, has constipation in past 2-3 years (possibly related to hyperparathyroidism), no consistent OH symptom, balance is \"a little off\". Had benefit from PT. Reports no significant  changes in memory since last visit.    Prior Hx:  Per chart review, tremors started in 2022: around the same time, patient started on Wellbutrin for worsening anxiety. At the time, Wellbutrin was working well for anxiety but patient noticed \"new onset tremors.\" Most recently followed with Neurology NP at [facility] in 3/2024: patient was improving on amantadine. No recent TSH available for review; Endocrinology following (last seen 9/2024).    Per patient, tremors started during adolescence, right more than left. Tremors continue to be confined to BUE but have worsened (more pronounced) over the past 10 years. Most noticeable with tasks (e.g. photography), but able to write, button clothes and use utensils. Has not interfered with social activities. Patient denied benefits with propranolol, biotin; patient does not remember effects of amantadine (she stopped taking the medication a few months prior). Tremors have slightly worsened over past 6 months. Patient " "denied freezing but described \"wobbly legs\" without falls. Denied anosmia, RBD, orthostasis, AVH. Reported constipation in past 4 months, that improved with hydration and stool softener.     Stopped Strattera in 10/2024. Restarted Wellbutrin in 9/2024 (patient denied taking it for \"several years\"). Started trazodone 2-3 months ago. Has been taking buspirone and venlafaxine at current dose for several years.    Patient also reported cognitive slowing and memory difficulties (e.g. trouble recalling names of students) in past few months. Sleeps 7 hours per night, usually feels well-rested upon waking. Consistent CPAP use. Denied current mood dysregulation but endorsed significant emotional stressors a few months ago. Patient drinks one cup of coffee in AM; sometimes has coffee or caffeinated soda in PM.    Denied family history of tremors or neurodegenerative conditions.    Working as teacher. Never smoker. Rarely uses alcohol; reported minimal improvement of tremors with alcohol. Denied illicit substance use.     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)    Malignant neoplasm of right kidney, except renal pelvis    Sleep apnea    Tremor    Valgus deformity of foot    Varicose veins of lower extremity    Vitamin D deficiency    Primary hyperparathyroidism (Multi)     Past Medical History:   Diagnosis Date    Colon polyp     Diverticulitis of colon 2023    Personal history of other diseases of the circulatory system     History of cardiac disorder    Personal history of other diseases of the circulatory system     History of hypertension    " Personal history of other diseases of the musculoskeletal system and connective tissue     History of arthritis    Personal history of other diseases of the respiratory system     History of asthma    Thyroid nodule      Past Surgical History:   Procedure Laterality Date     SECTION, LOW TRANSVERSE   &     OTHER SURGICAL HISTORY  2021    Nose surgery    OTHER SURGICAL HISTORY  2021     section    OTHER SURGICAL HISTORY  2021    Nephrectomy    OTHER SURGICAL HISTORY  2021    Cholecystectomy laparoscopic    OTHER SURGICAL HISTORY  2021    Salpingo-oophorectomy bilateral     Social History     Tobacco Use    Smoking status: Never    Smokeless tobacco: Never   Substance Use Topics    Alcohol use: Not Currently     Alcohol/week: 1.0 standard drink of alcohol     Types: 1 Standard drinks or equivalent per week     Comment: One drink per month     family history includes Alcohol abuse in her brother and mother's sister; Arthritis in her father and mother; Blood clot in her father; COPD in her mother; Cancer in her father, paternal grandfather, and another family member; Colon cancer in her paternal grandfather; Depression in her mother; Diabetes in her mother; Drug abuse in her brother and sister; Fibromyalgia in her mother; Hearing loss in her father; Heart disease in her paternal grandmother; Hernia in her sister; Hypertension in her father and another family member; Lymphoma in her father; Mental illness in her brother, mother's sister, and sister; Skin cancer in her mother; Stroke in her paternal grandmother; cardiac disorder in her mother.    Current Outpatient Medications:     atorvastatin (Lipitor) 20 mg tablet, Take 1 tablet (20 mg) by mouth once daily., Disp: , Rfl:     buPROPion XL (Wellbutrin XL) 150 mg 24 hr tablet, Take 1 tablet (150 mg) by mouth early in the morning.., Disp: , Rfl:     busPIRone (Buspar) 10 mg tablet, Take 2 tablets (20 mg) by mouth 2  times a day., Disp: , Rfl:     ferrous sulfate, 325 mg ferrous sulfate, tablet, Take 1 tablet by mouth 2 times a day before meals., Disp: , Rfl:     gabapentin (Neurontin) 300 mg capsule, Take 1 capsule (300 mg) by mouth once daily., Disp: , Rfl:     hydrOXYzine HCL (Atarax) 25 mg tablet, Take 1 tablet (25 mg) by mouth 2 times a day as needed for anxiety., Disp: , Rfl:     losartan (Cozaar) 50 mg tablet, Take 1 tablet (50 mg) by mouth once daily., Disp: , Rfl:     metFORMIN (Glucophage) 500 mg tablet, Take by mouth. TAKE ONE TABLET IN THE AM AND THREE TABLETS IN THE PM, Disp: , Rfl:     omeprazole (PriLOSEC) 40 mg DR capsule, Take 2 capsules (80 mg) by mouth once daily., Disp: , Rfl:     Ozempic 1 mg/dose (4 mg/3 mL) pen injector, Inject 1 mg under the skin 1 (one) time per week., Disp: , Rfl:     propranolol (Inderal) 20 mg tablet, Take 1 tablet (20 mg) by mouth every 12 hours., Disp: , Rfl:     venlafaxine XR (Effexor-XR) 150 mg 24 hr capsule, Take 1 capsule (150 mg) by mouth twice a day., Disp: , Rfl:     traZODone (Desyrel) 50 mg tablet, Take 1 tablet (50 mg) by mouth once daily at bedtime., Disp: , Rfl:   No Known Allergies    Objective   Vitals:    06/02/25 1518   BP: 111/77   Pulse: 80        Physical Exam  Neurological Exam:  MENTAL STATUS:  General appearance: alert, mildly anxious, NAD  Orientation: person, place, time    MOCA 23/30 on 11/25/2024. 5/5 visual spacial. 3/3 naming. 5/6 attention. 2/3 language. 2/2 abstraction. 0/5 recall. 6/6 orientation.     CRANIAL NERVES:  - II:  Visual fields intact to confrontation bilaterally  - III, IV, VI: EOMI to pursuit without nystagmus  - V: V1-V3 sensation intact bilaterally  - VII: Face muscles symmetric with smile and eye closure  - VIII: Intact to voice  - IX, X: Palate elevated symmetrically bilaterally, no hoarseness  - XI: 5/5 strength on shoulder shrugging bilaterally  - XII: Tongue midline without atrophy or fasciculation    MOTOR: Tone and bulk normal in  all extremities; very mild, fine tremors in bilateral hands. Archimedes spirals and handwriting intact.    STRENGTH: R L  Deltoid  5 5  Biceps  5 5  Triceps  5 5    5 5    Hip flexion 5 5  Quadriceps 5 5  Hamstrings 5 5    REFLEXES:  R  L  Biceps   2+ 2+  Triceps   2+ 2+  Patellar   2+ 2+    COORDINATION: Intact on finger to nose bl, intact on heel to shin bl    SENSORY: grossly intact to light touch in bl UE and LE    GAIT: Normal standard gait     MDS UPDRS 1st Score:   Motor Examination  Is the patient on medication for treating the symptoms of Parkinson's Disease?: No  Is the patient on Levodopa?: No  Speech: 0  Facial Expression: 0  Rigidty Neck: 0  Rigidty RUE: 0  Rigidity - LUE: 0  Rigidity RLE: 0  Rigidity LLE: 0  Finger Tapping Right Hand: 0  Finger Tapping Left Hand: 1  Hand Movements- Right Hand: 0  Hand Movements- Left Hand: 0  Pronatiaon-Supination Movments - Right Hand: 0  Pronatiaon-Supination Movments Left Hand: 0  Toe Tapping Right Foot: 1  Toe Tapping - Left Foot: 1  Leg Agility - Right Le  Leg Agility - Left le  Arising from Chair: 0  Gait: 0  Freezing of Gait: 0  Postural Stability: 2  Posture: 0  Global Spontanteity of Movment ( Body Bradykinesia): 0  Postural Tremor - Right Hand: 1  Postural Tremor - Left hand: 1  Kinetic Tremor - Right hand: 0  Kinetic Tremor - Left hand: 0  Rest Tremor Amplitude - RUE: 0  Rest Tremor Amplitude - LUE: 0  Rest Tremor Amplitude - RLE: 0  Rest Tremor Amplitude - LLE: 0  Rest Tremor Amplitude - Lip/Jaw: 0  Constancy of Rest Tremor: 0  MDS UPDRS Total Score: 7  Were dyskinesias (chorea or dystonia) present during examination?: No      Assessment/Plan   Park Nicole is a 55 y.o. female with PMHx RCC s/p R nephrectomy (), hyperparathyroidism, ROSALBA, PLMD, anxiety, depression who  was first evaluated 25 for mild fine tremor most consistent with enhanced vs ET. Also Noted to have MCI (MoCA ) and endorsed difficulties with balance. For these,  MRI brain and blood work for reversible causes of cognitive changes were obtained with no pertinent finding.  She is scheduled to have NP testing at the end of this month.  Her balance did improve with PT but now reportedly has again worsened (no fall).  Again denies any PD prodrome (except for constipation that is likely related to hyperparathyroidism).  Very minimal tremor on exam today but did have some postural instability.    The following was discussed   - The MRI and blood work we did were all reassuring  - It is reassuring that your tremors have not worsened  - Regarding the balance issues, we have referred you for another round of physical therapy  - Keep your appointment with the neuropsychologist for memory testing. Please make sure to request a session with them to discuss the results after testing  - We will plan to see you in 1 year for follow up but of course can see you sooner if need be.      Severine L Kako, MD     I saw and evaluated the patient. I personally obtained the key and critical portions of the history and physical exam or was physically present for key and critical portions performed by the resident/fellow. I reviewed the resident/fellow's documentation and discussed the patient with the resident/fellow. I agree with the resident/fellow's medical decision making as documented in the note.    Staci Tavares MD     For the Evaluation and Management of this patient, the level of Medical Decision Making for this visit was determined based on the following:    The level of COMPLEXITY AND NUMBER OF PROBLEMS ADDRESSED was , MODERATE,] as determined by:     MODERATE:    two or more stable chronic illnesses.      The AMOUNT/COMPLEXITY OF DATA TO REVIEW (reviewed, ordered or call for) was LIMITED] as determined by:      LIMITED:  my review of prior external notes from a unique source.      The level of RISK OF COMPLICATIONS was  LOW] as determined by:        LOW:  A low risk of morbidity from  additional diagnostic testing or treatment.      Thus, the level of medical decision making (based on the lower of the two highest elements) was determined to be [LOW]. Therefore the appropriate E/M code for this encounter is [67688,        This is a chronic neurologic condition that requires ongoing care and monitoring. This is a complex, serious condition that needs long term care going forward. Between myself and the patient we will be changing direction of care depending on responses to treatment.   Today we discussed medication options, non medication options for management and various other symptoms that are in relation to this disease.  I will continue to be involved in the care of this patient.

## 2025-06-02 NOTE — PATIENT INSTRUCTIONS
Pleasure meeting you today. We discussed the following:  - The MRI and blood work we did were all reassuring  - It is reassuring that your tremors have not worsened  - Regarding the balance issues, we have referred you for another round of physical therapy  - Keep your appointment with the neuropsychologist for memory testing. Please make sure to request a session with them to discuss the results after testing  - We will plan to see you in 1 year for follow up but of course can see you sooner if need be.

## 2025-06-03 LAB — TSH SERPL-ACNC: 1.13 MIU/L

## 2025-06-17 ENCOUNTER — APPOINTMENT (OUTPATIENT)
Dept: RADIOLOGY | Facility: CLINIC | Age: 55
End: 2025-06-17
Payer: COMMERCIAL

## 2025-06-23 ENCOUNTER — HOSPITAL ENCOUNTER (OUTPATIENT)
Dept: RADIOLOGY | Facility: CLINIC | Age: 55
Discharge: HOME | End: 2025-06-23
Payer: COMMERCIAL

## 2025-06-23 DIAGNOSIS — Z12.31 ENCOUNTER FOR SCREENING MAMMOGRAM FOR BREAST CANCER: Primary | ICD-10-CM

## 2025-06-23 DIAGNOSIS — R92.8 ABNORMAL MAMMOGRAM: ICD-10-CM

## 2025-06-23 PROCEDURE — 77061 BREAST TOMOSYNTHESIS UNI: CPT | Mod: RT

## 2025-06-23 PROCEDURE — 77061 BREAST TOMOSYNTHESIS UNI: CPT | Mod: RIGHT SIDE | Performed by: RADIOLOGY

## 2025-06-23 PROCEDURE — 77065 DX MAMMO INCL CAD UNI: CPT | Mod: RIGHT SIDE | Performed by: RADIOLOGY

## 2025-06-30 ENCOUNTER — OFFICE VISIT (OUTPATIENT)
Facility: CLINIC | Age: 55
End: 2025-06-30
Payer: COMMERCIAL

## 2025-06-30 DIAGNOSIS — Z00.00 HEALTHCARE MAINTENANCE: ICD-10-CM

## 2025-06-30 DIAGNOSIS — F41.9 ANXIETY: ICD-10-CM

## 2025-06-30 DIAGNOSIS — Z01.818 PREOPERATIVE TESTING: ICD-10-CM

## 2025-06-30 DIAGNOSIS — R41.9 COGNITIVE COMPLAINTS: ICD-10-CM

## 2025-06-30 DIAGNOSIS — F32.A DEPRESSION, UNSPECIFIED DEPRESSION TYPE: ICD-10-CM

## 2025-06-30 DIAGNOSIS — F54 PSYCHOLOGICAL FACTORS AFFECTING MEDICAL CONDITION: ICD-10-CM

## 2025-06-30 DIAGNOSIS — R41.3 MEMORY DIFFICULTY: Primary | ICD-10-CM

## 2025-06-30 DIAGNOSIS — E21.0 PRIMARY HYPERPARATHYROIDISM (MULTI): ICD-10-CM

## 2025-06-30 PROCEDURE — 99211NT NEUROPYSCH TESTING PENDING FINAL BILLING: Performed by: PSYCHOLOGIST

## 2025-07-01 NOTE — PROGRESS NOTES
"Neuropsychology Evaluation    Name: Park Nicole  : 1970  MRN: [id number]  Referring Provider: Staci Tavares MD    Date of Service: 2025      Reason for Visit: Neuropsychological evaluation related to {Visit Reason:70215::\"memory difficulty and cognitive complaints\"}.  No chief complaint on file.      Summary/Impressions: In the context of presumed {performance descriptors:65656} premorbid intellectual functioning, present results demonstrated:  Performance validity: ***  Deficits:  Weaknesses:  Largely WNL:     Performance validity: ***  Simple auditory attention span: {performance descriptors:80434}.  Processing speed: {performance descriptors:82558}.  Visuospatial abilities: {performance descriptors:38917}.  Language: {performance descriptors:79894}.  Executive functions: {performance descriptors:26805}.  Learning/memory: {performance descriptors:50162}.  Fine motor {Motor Speed/Dexterity:05082}: {Motor Performance:00014}.  Psychological symptoms:     intact cognitive abilities in all domains tested (i.e., simple auditory attention, working memory, processing speed, visuospatial abilities, language, learning and memory, executive functioning), with several above-average performances, including relative strengths in learning/memory and aspects of executive functioning, and no scores falling below normal/expected ranges. There were also instances of better or comparable performance on more difficult tests of the same cognitive domain, suggesting the ability to marshal additional resources under more challenging circumstances, most often seen in individuals in whom non-neurological (e.g., psychological, behavioral, motivational, situational) factors may be interfering with optimal cognitive effectiveness/task engagement.     DIAGNOSTIC IMPRESSIONS:    RECOMMENDATIONS & CONSIDERATIONS:    1.         Thank you for the opportunity to participate in Ms. Nicole's evaluation and care. If I can be of " "further assistance, please do not hesitate to contact me at [phone].      -- EXTENDED REPORT --      History of Presenting Illness: Ms. Nicole is a*** 55 year-old, {Handed:22990::\"right-handed\"}, {Race:78351::\"\"} female, with history notable for ***.     RELEVANT LABS/EXAMS (with radiologist impressions of neuroimaging):      *** -  {Neuroimagin} {CT CONTRAST:23884}: ***    *** -  {Neuroimagin} {CT CONTRAST:24975}: ***    CLINICAL INTERVIEW: {Neuropsych Symptoms:63826}.    Ms. Nicole's {accompanied by:91391} described ***. There have not been any dramatic personality changes or concerns regarding vulnerability to deception.    {memory loss impairments:36623}  {memory loss signs and symptoms:85599}  {CoCM Memory concerns:94810}    Relevant Medical Status & History:   No reported balance/gait disturbance, recent unprovoked falls, or consistent involuntary/uncontrolled movements (though she noticed her hands can be shaky in the morning/during the day at times, and her handwriting has become messier).  No recent urinary changes. Hearing and vision are reportedly adequate. She is not prone to headaches, migraines, dizziness, or syncopal events. She denied significant issue with pain. She reported sustaining a head injury ~*** ago, with no LOC, PTA, or lingering symptoms. There is no known history of seizures, clinical cerebrovascular events, or other neurological disorder.     Balance/gait disturbance:  {Yes/No + Qualifier:44574::\"None reported.\"}  Recent unprovoked falls:  {Yes/No + Qualifier:34791::\"None reported.\"}  Involuntary motor movements (e.g., tremors):  {Yes/No + Qualifier:16021::\"None reported.\"}  Recent urinary changes:  {Yes/No + Qualifier:36098::\"None reported.\"} (i.e., increased frequency, urgency, episodes of urinary incontinence.     Hx concussion/head injury:  {Yes/No + Qualifier:90277::\"None reported.\"}  Hx seizure:  {Yes/No + Qualifier:57120::\"None reported.\"}  Hx " "stroke/TIA:  {Yes/No + Qualifier:82965::\"None reported.\"}    Problem List[1]  Medical History[2]  Encounter Medications[3]    Psychiatric Status & History:     Developmental/Educational/Psychosocial History:      Relevant Family History:    Procedures/Tests:  The purpose of this evaluation was reviewed, as were procedural details, issues related to confidentiality, and options for receiving feedback regarding results. All questions were answered; Ms. Nicole verbalized understanding and agreed to proceed with this evaluation.  with Ms. Nicole and her {accompanied by:40909}    Review of available records; Adult Neuropsychology History Form; Clinical interview with Ms. Nicole and her {accompanied by:02544} conjointly;   Neuropsychological measures (administered by a psychometrist):      , unless otherwise noted (* indicates measures administered by the neuropsychologist)  {Neuropsych Test List - Typical Batteries:08719}  {Neuropsych Test List - Frequently Used:77586}  {Neuropsych Test List - Less Commonly Used:26231}    Test results interpreted in the context of the appropriate normative data.     Behavioral Observations/Neurobehavioral Status Exam: Ms. Nicole arrived for the appointment on time and {accompanied:98196}. She was casually dressed and appropriately groomed. Gait was steady/unremarkable / somewhat slow and unsteady. There were no overt gross motor abnormalities apparent / apparent involuntary motor movements. She was alert and fully / broadly oriented to self, situation, place, and time. She wore glasses; vision was reportedly adequate for testing. {Hearin}. Conversational speech was {speech fluency:15776::\"fluent\"} and {Speech:61847::\"normal in rate, prosody, and volume, with no apparent language abnormalities\"}. Ms. Nicole was {Historian:42986}. Organizing her thoughts appeared quite effortful. Expressed thoughts were {thought processes:15628}. She demonstrated " {poor/limited/adequate/good:09908} insight into cognitive and functional difficulties. Affect was {Affect:45712}. Overall, she was cooperative in completing evaluation procedures and appeared to give her best effort on tasks.     Results/Data:       Descriptor T-Score Standard Score Z-Score Scaled Score %ile Rank   Very Superior > 70 > 130 > 2.00  > 16 > 98   Superior 63-69 120-129 1.34 to 1.99 14-15 91-97   High Average 57-62 110-119 0.67 to 1.33 12-13 75-90   Average 43-56  -0.66 to 0.66 8-11 25-74   Low Average 37-42 80-89 -1.33 to -0.67 6-7 9-24   Borderline 33-36 75-79 -1.67 to -1.34 5 5-8   Mildly Deficient 30-32 70-74 -2.00 to -1.68 4 2-4   Moderately Deficient 25-29 62-69  -2.53 to -2.01 3 1   Severely Deficient <24 <61 <-2.54 1-2 <1            Time-based service: 93836 (48 minutes); 89623 (60 minutes); 27909 (*** minutes); 18493 (30 minutes); 65652 (213 minutes)       [1]   Patient Active Problem List  Diagnosis    ADD (attention deficit disorder)    Allergic rhinitis    Anemia    Carpal tunnel syndrome on left    Diarrhea    Hyperthyroidism    Diverticular disease    Dysphagia    Fatigue    H/O right nephrectomy    Hyperglycemia    Depression    AI (generalized anxiety disorder)    Morbid obesity (Multi)    Obesity (BMI 30-39.9)    Pain in lower limb    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)    Malignant neoplasm of right kidney, except renal pelvis    Sleep apnea    Tremor    Valgus deformity of foot    Varicose veins of lower extremity    Vitamin D deficiency    Primary hyperparathyroidism (Multi)   [2]   Past Medical History:  Diagnosis Date    ADHD (attention deficit hyperactivity disorder)     Anxiety     Cancer (Multi) 2021    Colon polyp     Depression     Diabetes mellitus (Multi)     Diverticulitis of colon 2023    Headache     Hypertension 2005    Obesity     Personal history of other diseases of the circulatory system     History of cardiac disorder    Personal history of other diseases of the  circulatory system     History of hypertension    Personal history of other diseases of the musculoskeletal system and connective tissue     History of arthritis    Personal history of other diseases of the respiratory system     History of asthma    Sleep apnea 2019    Thyroid nodule 1986    Tremor    [3]   Outpatient Encounter Medications as of 6/30/2025   Medication Sig Dispense Refill    atorvastatin (Lipitor) 20 mg tablet Take 1 tablet (20 mg) by mouth once daily.      buPROPion XL (Wellbutrin XL) 150 mg 24 hr tablet Take 1 tablet (150 mg) by mouth early in the morning..      busPIRone (Buspar) 10 mg tablet Take 2 tablets (20 mg) by mouth 2 times a day.      ferrous sulfate, 325 mg ferrous sulfate, tablet Take 1 tablet by mouth 2 times a day before meals.      gabapentin (Neurontin) 300 mg capsule Take 1 capsule (300 mg) by mouth once daily.      hydrOXYzine HCL (Atarax) 25 mg tablet Take 1 tablet (25 mg) by mouth 2 times a day as needed for anxiety.      losartan (Cozaar) 50 mg tablet Take 1 tablet (50 mg) by mouth once daily.      metFORMIN (Glucophage) 500 mg tablet Take by mouth. TAKE ONE TABLET IN THE AM AND THREE TABLETS IN THE PM      omeprazole (PriLOSEC) 40 mg DR capsule Take 2 capsules (80 mg) by mouth once daily.      Ozempic 1 mg/dose (4 mg/3 mL) pen injector Inject 1 mg under the skin 1 (one) time per week.      propranolol (Inderal) 20 mg tablet Take 1 tablet (20 mg) by mouth every 12 hours.      traZODone (Desyrel) 50 mg tablet Take 1 tablet (50 mg) by mouth once daily at bedtime.      venlafaxine XR (Effexor-XR) 150 mg 24 hr capsule Take 1 capsule (150 mg) by mouth twice a day.       No facility-administered encounter medications on file as of 6/30/2025.      "well-being/quality of life. Similarly, she is likely to benefit from increased engagement in activities he finds enjoyable and/or rewarding (which may involve exploring/pursuing new ventures or hobbies).     6.   A planned re-evaluation does not appear necessary at this time. If concerns persist or worsen after psychological factors are better managed, repeat neuropsychological assessment (as clinically indicated; no sooner than 1-2 years), using present data as baseline, may be helpful in determining if there is cognitive change over time. Re-evaluation without first addressing the above factors would be of limited benefit, given the degree to which psychological/sleep difficulties likely interfere with Ms. Nicole's functional status.      Thank you for the opportunity to participate in Ms. Nicole's evaluation and care. If I can be of further assistance, please do not hesitate to contact me at [phone].      -- EXTENDED REPORT --      History of Presenting Illness: Ms. Nicole is a 55 year-old, right-handed,  female, seen by Dr. Tavares on 11/25/2024 for neurological evaluation of tremor; on exam, MoCA = 23/30 (5/5 visuospatial/executive;.3/3 naming; 5/6 attention; 2/3 language; 2/2 abstraction; 0/5 recall; 6/6 orientation), \"reflective of mild cognitive impairment.\" Clinical impressions of mild, fine tremor on exam were of enhanced physiological tremor vs mild ET. Etiological considerations of cognitive difficulty included mood disorder vs metabolic encephalopathy vs primary neurodegenerative process.     RELEVANT LABS/EXAMS (with radiologist impressions of neuroimaging):      12/16/2024 -  Brain MRI w/o IV contrast:   No diffusion restriction abnormality to suggest acute infarct. Minimal degree of nonspecific white matter signal compatible with microangiopathy. No mass effect or midline shift. Ventricles, sulci, and basal cisterns within normal limits. No extra-axial fluid collection.    Problem " List[1]  Medical History[2]  Encounter Medications[3]    Psychiatric Status & History:     Developmental/Educational/Psychosocial History:      Relevant Family History: Stroke (paternal grandmother); otherwise, no known family history of neurological conditions, dementia, or cognitive impairment/decline.     Procedures/Tests:  The purpose of this evaluation was reviewed, as were procedural details, issues related to confidentiality, and options for receiving feedback regarding results. All questions were answered; Ms. Nicole verbalized understanding and agreed to proceed with this evaluation.    Review of available records; Adult Neuropsychology History Form; Clinical interview with Ms. Nicole;  Neuropsychological measures (administered by a psychometrist):   Keith Anxiety Inventory (MARIANNA)  Keith Depression Inventory (BDI-2)  Brief Visuospatial Memory Test-Revised (BVMT-R) - Form 1   [place] Verbal Learning Test-Second Edition (CVLT-II)  Stephanie-Thompson Executive Function System (D-KEFS) Verbal Fluency  Dot Counting Test (DCT)  [place] Multiphasic Personality Inventory-2nd Edition-Restructured Form (MMPI-2-RF)  Neuropsychological Assessment Battery (NAB) Naming Test  Stroop Color and Word Test (Stroop)  Trail Making Test (TMT)  Wechsler Adult Intelligence Scale-Fourth Edition (WAIS-IV) Digit Span, Symbol Search, and Coding subtests  Wechsler Memory Scale-Fourth Edition (WMS-IV) Logical Memory  Wechsler Test of Adult Reading (WTAR)  [place] Card Sorting Test (WCST)    Test results interpreted in the context of the appropriate normative data.     Behavioral Observations/Neurobehavioral Status Exam: Ms. Nicole arrived for the appointment 30 minutes early and unaccompanied. She was casually dressed and appropriately groomed. Gait was steady/unremarkable. There were no apparent involuntary motor movements. She was alert and fully oriented to self, situation, place, and time. She wore glasses; vision was reportedly  adequate for testing. Hearing and receptive language appeared intact on informal observation. Conversational speech was fluent and normal in rate, prosody, and volume, with no apparent language abnormalities. Ms. Nicole was open and responsive to questions, with no apparent difficulty providing details of autobiographical and clinical history. Expressed thoughts were logical and goal-directed. She demonstrated good insight into cognitive and functional difficulties. Affect was full, congruent with mood/context/thought content, and largely dysphoric, with tearfulness on clinical interview and occasional appropriate brightening; there was a degree of apparent anxiety during formal testing. Overall, she was cooperative in completing evaluation procedures and appeared to give her best effort on tasks.     Results/Data:       Descriptor T-Score Standard Score Z-Score Scaled Score %ile Rank   Very Superior > 70 > 130 > 2.00  > 16 > 98   Superior 63-69 120-129 1.34 to 1.99 14-15 91-97   High Average 57-62 110-119 0.67 to 1.33 12-13 75-90   Average 43-56  -0.66 to 0.66 8-11 25-74   Low Average 37-42 80-89 -1.33 to -0.67 6-7 9-24   Borderline 33-36 75-79 -1.67 to -1.34 5 5-8   Mildly Deficient 30-32 70-74 -2.00 to -1.68 4 2-4   Moderately Deficient 25-29 62-69  -2.53 to -2.01 3 1   Severely Deficient <24 <61 <-2.54 1-2 <1            Time-based service: 63151 (48 minutes); 33461 (60 minutes); 62657 (128 minutes); 18833 (30 minutes); 29230 (213 minutes)         [1]   Patient Active Problem List  Diagnosis    ADD (attention deficit disorder)    Allergic rhinitis    Anemia    Carpal tunnel syndrome on left    Diarrhea    Hyperthyroidism    Diverticular disease    Dysphagia    Fatigue    H/O right nephrectomy    Hyperglycemia    Depression    AI (generalized anxiety disorder)    Morbid obesity (Multi)    Obesity (BMI 30-39.9)    Pain in lower limb    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)    Malignant neoplasm of right kidney,  except renal pelvis    Sleep apnea    Tremor    Valgus deformity of foot    Varicose veins of lower extremity    Vitamin D deficiency    Primary hyperparathyroidism (Multi)   [2]   Past Medical History:  Diagnosis Date    ADHD (attention deficit hyperactivity disorder)     Anxiety     Cancer (Multi) 2021    Colon polyp     Depression     Diabetes mellitus (Multi)     Diverticulitis of colon 2023    Headache     Hypertension 2005    Obesity     Personal history of other diseases of the circulatory system     History of cardiac disorder    Personal history of other diseases of the circulatory system     History of hypertension    Personal history of other diseases of the musculoskeletal system and connective tissue     History of arthritis    Personal history of other diseases of the respiratory system     History of asthma    Sleep apnea 2019    Thyroid nodule 1986    Tremor    [3]   Outpatient Encounter Medications as of 6/30/2025   Medication Sig Dispense Refill    atorvastatin (Lipitor) 20 mg tablet Take 1 tablet (20 mg) by mouth once daily.      buPROPion XL (Wellbutrin XL) 150 mg 24 hr tablet Take 1 tablet (150 mg) by mouth early in the morning..      busPIRone (Buspar) 10 mg tablet Take 2 tablets (20 mg) by mouth 2 times a day.      ferrous sulfate, 325 mg ferrous sulfate, tablet Take 1 tablet by mouth 2 times a day before meals.      gabapentin (Neurontin) 300 mg capsule Take 1 capsule (300 mg) by mouth once daily.      hydrOXYzine HCL (Atarax) 25 mg tablet Take 1 tablet (25 mg) by mouth 2 times a day as needed for anxiety.      losartan (Cozaar) 50 mg tablet Take 1 tablet (50 mg) by mouth once daily.      metFORMIN (Glucophage) 500 mg tablet Take by mouth. TAKE ONE TABLET IN THE AM AND THREE TABLETS IN THE PM      omeprazole (PriLOSEC) 40 mg DR capsule Take 2 capsules (80 mg) by mouth once daily.      Ozempic 1 mg/dose (4 mg/3 mL) pen injector Inject 1 mg under the skin 1 (one) time per week.      propranolol  (Inderal) 20 mg tablet Take 1 tablet (20 mg) by mouth every 12 hours.      traZODone (Desyrel) 50 mg tablet Take 1 tablet (50 mg) by mouth once daily at bedtime.      venlafaxine XR (Effexor-XR) 150 mg 24 hr capsule Take 1 capsule (150 mg) by mouth twice a day.       No facility-administered encounter medications on file as of 6/30/2025.

## 2025-07-03 ENCOUNTER — TELEPHONE (OUTPATIENT)
Dept: SURGERY | Facility: CLINIC | Age: 55
End: 2025-07-03
Payer: COMMERCIAL

## 2025-07-03 NOTE — TELEPHONE ENCOUNTER
Called patient to confirm OR date 7/29/25. Notified patient she needs to complete labs and an ECG before the date of surgery. Orders in EMR.  Patient would like to receive pre-op instructions via email and address confirmed. All questions addressed. Callback number provided.

## 2025-07-08 ENCOUNTER — PREP FOR PROCEDURE (OUTPATIENT)
Dept: SURGICAL ONCOLOGY | Facility: HOSPITAL | Age: 55
End: 2025-07-08
Payer: COMMERCIAL

## 2025-07-08 DIAGNOSIS — E21.0 PRIMARY HYPERPARATHYROIDISM (MULTI): Primary | ICD-10-CM

## 2025-07-08 NOTE — H&P (VIEW-ONLY)
Park Nicole is a 55 y.o. female who has a known history of primary hyperparathyroidism.  Prior to her coming today she did get some follow-up lab work with her endocrinologist on  5/10/2025.  Her calcium still remains elevated at 11.1, vitamin D was normal at 45 and parathyroid hormone level remains high at 113.  All of this is consistent with primary hyperparathyroidism.     She did undergo a DEXA and bone density test.   Bilateral distal radius which were both normal.  This is the area of bone most likely to be affected by parathyroid hormone.  It does not appear that they check her lumbar spine or her femurs.     She had a sestamibi scan completed which did show an area of increased uptake behind the right thyroid lobe.  Radiology felt that this could represent a parathyroid adenoma.  Therefore I had her come to the office today to do an ultrasound of that area.     She denies any changes in her neck and is otherwise no changes since I last saw her in her general medical history since March.     Review of Systems     Objective  Physical Exam  Vitals reviewed.   Constitutional:       Appearance: Normal appearance.   Neurological:      Mental Status: She is alert.         Patient ID: Park Nicole is a 55 y.o. female.     General     Date/Time: 5/22/2025 11:59 AM     Performed by: Edvin Blanca MD  Authorized by: Edvin Blanca MD    Consent:     Consent obtained:  Verbal    Consent given by:  Patient    Risks, benefits, and alternatives were discussed: yes      Alternatives discussed:  No treatment and observation  Procedure specific details:      Neck ultrasound     In the office I did a neck ultrasound with a 6-15 MHz linear ultrasound probe to look for possible parathyroid adenoma in a patient with biochemical evidence of primary hyperparathyroidism.  I focus initially on the right side as this was the area that radiology felt she could have a parathyroid on her sestamibi scan.  Right thyroid lobe does  contain a 2.1 cm hypoechoic nodule smooth bordered.  TI-RADS 4.  Sitting directly behind it is a hypoechoic extrathyroidal mass smooth bordered 1 cm in length consistent with a right inferior parathyroid adenoma.  This is seen in both transverse and sagittal views and has a classic vascular arc associated with it.  Left lobe contains a 1.1 x 0.4 cm TI-RADS 4 nodule.     Images were captured and reviewed with the patient.  Post-procedure details:     Procedure completion:  Tolerated           Assessment/Plan  Mrs. Nicole has biochemical evidence of primary hyperparathyroidism with persistently elevated calcium around 11.1 parathyroid hormone level 113.  Her sestamibi scan and ultrasound in the office today are both consistent with a right sided parathyroid adenoma.  I think she would be a good candidate for resection.    I did confirm with her endocrinologist that she had bilateral biopsies of her thyroid nodules done in 2018 that were both benign.  On her most recent ultrasound these look stable.  Patient is not interested in any thyroid surgery just her parathyroidectomy and ongoing monitoring of her nodules with her endocrinologist.    Plan    1.  I am scheduling her for parathyroidectomy at [facility] on Tuesday, July 29, 2025.  My office will call her the day before surgery with what time to arrive.    2.  My nurse will send her one of our preoperative packets and be sure that she has updated preoperative blood work in preparation for surgery as well as an EKG.

## 2025-07-08 NOTE — H&P
Park Nicole is a 55 y.o. female who has a known history of primary hyperparathyroidism.  Prior to her coming today she did get some follow-up lab work with her endocrinologist on  5/10/2025.  Her calcium still remains elevated at 11.1, vitamin D was normal at 45 and parathyroid hormone level remains high at 113.  All of this is consistent with primary hyperparathyroidism.     She did undergo a DEXA and bone density test.   Bilateral distal radius which were both normal.  This is the area of bone most likely to be affected by parathyroid hormone.  It does not appear that they check her lumbar spine or her femurs.     She had a sestamibi scan completed which did show an area of increased uptake behind the right thyroid lobe.  Radiology felt that this could represent a parathyroid adenoma.  Therefore I had her come to the office today to do an ultrasound of that area.     She denies any changes in her neck and is otherwise no changes since I last saw her in her general medical history since March.     Review of Systems     Objective  Physical Exam  Vitals reviewed.   Constitutional:       Appearance: Normal appearance.   Neurological:      Mental Status: She is alert.         Patient ID: Park Nicole is a 55 y.o. female.     General     Date/Time: 5/22/2025 11:59 AM     Performed by: Edvin Blanca MD  Authorized by: Edvin Blanca MD    Consent:     Consent obtained:  Verbal    Consent given by:  Patient    Risks, benefits, and alternatives were discussed: yes      Alternatives discussed:  No treatment and observation  Procedure specific details:      Neck ultrasound     In the office I did a neck ultrasound with a 6-15 MHz linear ultrasound probe to look for possible parathyroid adenoma in a patient with biochemical evidence of primary hyperparathyroidism.  I focus initially on the right side as this was the area that radiology felt she could have a parathyroid on her sestamibi scan.  Right thyroid lobe does  contain a 2.1 cm hypoechoic nodule smooth bordered.  TI-RADS 4.  Sitting directly behind it is a hypoechoic extrathyroidal mass smooth bordered 1 cm in length consistent with a right inferior parathyroid adenoma.  This is seen in both transverse and sagittal views and has a classic vascular arc associated with it.  Left lobe contains a 1.1 x 0.4 cm TI-RADS 4 nodule.     Images were captured and reviewed with the patient.  Post-procedure details:     Procedure completion:  Tolerated           Assessment/Plan  Mrs. Nicole has biochemical evidence of primary hyperparathyroidism with persistently elevated calcium around 11.1 parathyroid hormone level 113.  Her sestamibi scan and ultrasound in the office today are both consistent with a right sided parathyroid adenoma.  I think she would be a good candidate for resection.    I did confirm with her endocrinologist that she had bilateral biopsies of her thyroid nodules done in 2018 that were both benign.  On her most recent ultrasound these look stable.  Patient is not interested in any thyroid surgery just her parathyroidectomy and ongoing monitoring of her nodules with her endocrinologist.    Plan    1.  I am scheduling her for parathyroidectomy at [facility] on Tuesday, July 29, 2025.  My office will call her the day before surgery with what time to arrive.    2.  My nurse will send her one of our preoperative packets and be sure that she has updated preoperative blood work in preparation for surgery as well as an EKG.

## 2025-07-22 ENCOUNTER — HOSPITAL ENCOUNTER (OUTPATIENT)
Dept: CARDIOLOGY | Facility: HOSPITAL | Age: 55
Discharge: HOME | End: 2025-07-22
Payer: COMMERCIAL

## 2025-07-22 DIAGNOSIS — Z01.818 PREOPERATIVE TESTING: ICD-10-CM

## 2025-07-22 DIAGNOSIS — Z00.00 HEALTHCARE MAINTENANCE: ICD-10-CM

## 2025-07-22 DIAGNOSIS — E21.0 PRIMARY HYPERPARATHYROIDISM (MULTI): ICD-10-CM

## 2025-07-22 LAB
ALBUMIN SERPL-MCNC: 3.9 G/DL (ref 3.6–5.1)
ALP SERPL-CCNC: 100 U/L (ref 37–153)
ALT SERPL-CCNC: 19 U/L (ref 6–29)
ANION GAP SERPL CALCULATED.4IONS-SCNC: 9 MMOL/L (CALC) (ref 7–17)
APTT PPP: 26 SEC (ref 23–32)
AST SERPL-CCNC: 17 U/L (ref 10–35)
BILIRUB SERPL-MCNC: 0.5 MG/DL (ref 0.2–1.2)
BUN SERPL-MCNC: 12 MG/DL (ref 7–25)
CALCIUM SERPL-MCNC: 10.3 MG/DL (ref 8.6–10.4)
CHLORIDE SERPL-SCNC: 103 MMOL/L (ref 98–110)
CO2 SERPL-SCNC: 26 MMOL/L (ref 20–32)
CREAT SERPL-MCNC: 0.99 MG/DL (ref 0.5–1.03)
EGFRCR SERPLBLD CKD-EPI 2021: 68 ML/MIN/1.73M2
ERYTHROCYTE [DISTWIDTH] IN BLOOD BY AUTOMATED COUNT: 12.5 % (ref 11–15)
GLUCOSE SERPL-MCNC: 82 MG/DL (ref 65–99)
HCT VFR BLD AUTO: 41.7 % (ref 35–45)
HGB BLD-MCNC: 13.3 G/DL (ref 11.7–15.5)
INR PPP: 1
MCH RBC QN AUTO: 30.2 PG (ref 27–33)
MCHC RBC AUTO-ENTMCNC: 31.9 G/DL (ref 32–36)
MCV RBC AUTO: 94.8 FL (ref 80–100)
PLATELET # BLD AUTO: 273 THOUSAND/UL (ref 140–400)
PMV BLD REES-ECKER: 10.6 FL (ref 7.5–12.5)
POTASSIUM SERPL-SCNC: 4.2 MMOL/L (ref 3.5–5.3)
PROT SERPL-MCNC: 6.7 G/DL (ref 6.1–8.1)
PROTHROMBIN TIME: 10.8 SEC (ref 9–11.5)
RBC # BLD AUTO: 4.4 MILLION/UL (ref 3.8–5.1)
SODIUM SERPL-SCNC: 138 MMOL/L (ref 135–146)
TSH SERPL-ACNC: 1.32 MIU/L
VIT B12 SERPL-MCNC: 417 PG/ML (ref 200–1100)
WBC # BLD AUTO: 5.7 THOUSAND/UL (ref 3.8–10.8)

## 2025-07-22 PROCEDURE — 93005 ELECTROCARDIOGRAM TRACING: CPT

## 2025-07-23 ENCOUNTER — OFFICE VISIT (OUTPATIENT)
Facility: CLINIC | Age: 55
End: 2025-07-23
Payer: COMMERCIAL

## 2025-07-23 DIAGNOSIS — F54 PSYCHOLOGICAL FACTORS AFFECTING MEDICAL CONDITION: ICD-10-CM

## 2025-07-23 DIAGNOSIS — R41.3 MEMORY DIFFICULTY: Primary | ICD-10-CM

## 2025-07-23 DIAGNOSIS — R41.9 COGNITIVE COMPLAINTS WITH NORMAL NEUROPSYCHOLOGICAL EXAM: ICD-10-CM

## 2025-07-23 DIAGNOSIS — F32.A DEPRESSION, UNSPECIFIED DEPRESSION TYPE: ICD-10-CM

## 2025-07-23 DIAGNOSIS — F41.9 ANXIETY: ICD-10-CM

## 2025-07-23 PROCEDURE — 96116 NUBHVL XM PHYS/QHP 1ST HR: CPT | Mod: AH | Performed by: PSYCHOLOGIST

## 2025-07-23 PROCEDURE — 96133 NRPSYC TST EVAL PHYS/QHP EA: CPT | Performed by: PSYCHOLOGIST

## 2025-07-23 PROCEDURE — 96138 PSYCL/NRPSYC TECH 1ST: CPT | Performed by: PSYCHOLOGIST

## 2025-07-23 PROCEDURE — 96116 NUBHVL XM PHYS/QHP 1ST HR: CPT | Performed by: PSYCHOLOGIST

## 2025-07-23 PROCEDURE — 96139 PSYCL/NRPSYC TST TECH EA: CPT | Performed by: PSYCHOLOGIST

## 2025-07-23 PROCEDURE — 96132 NRPSYC TST EVAL PHYS/QHP 1ST: CPT | Mod: AH | Performed by: PSYCHOLOGIST

## 2025-07-23 PROCEDURE — 96138 PSYCL/NRPSYC TECH 1ST: CPT | Mod: AH | Performed by: PSYCHOLOGIST

## 2025-07-23 PROCEDURE — 96133 NRPSYC TST EVAL PHYS/QHP EA: CPT | Mod: AH | Performed by: PSYCHOLOGIST

## 2025-07-23 PROCEDURE — 96132 NRPSYC TST EVAL PHYS/QHP 1ST: CPT | Performed by: PSYCHOLOGIST

## 2025-07-23 PROCEDURE — 96139 PSYCL/NRPSYC TST TECH EA: CPT | Mod: AH | Performed by: PSYCHOLOGIST

## 2025-07-23 NOTE — PROGRESS NOTES
Neuropsychology Note    Name: Park Nicole  : 1970  MRN: [id number]      Date of Service: 2025     Reason For Visit: Ms. Nicole underwent neuropsychological evaluation on 2025, due to memory difficulty and concern regarding cognitive decline. The full report can be found in the note for that visit.     She returned today for feedback regarding evaluation results.    Summary: Ms. Nicole was seen today to discuss findings from her neuropsychological evaluation on 2025; she was unaccompanied.    Results were reviewed, with a thorough discussion of clinical impressions. I explained that findings do not indicate cognitive impairment, and the overall neuropsychological profile (i.e., with largely average/above-average performances and instances of better performance on more challenging measures of the same domain) is most consistent with intermittent interference from psychological/constitutional factors (e.g., stress, anxiety, depression, fatigue) on cognitive effectiveness/test performance, rather than organic neurological dysfunction.     There was a thorough discussion of clinical impressions, including diagnosis of ***.     Patient education was provided regarding {Neuropsych Feedback Psychoeducation:43834}. Recommendations were discussed, and questions were addressed.    Ms. Nicole was appropriately engaged in the appointment; affect was {Affect:29468}. She verbalized understanding of findings, impressions, and recommendations.          Time-based service:    - Evaluation: 12994 (*** minutes); 84127 (60 minutes); 21678 (*** minutes); 69300 (30 minutes); 11089 (*** minutes)   - Feedback: 22594 (64 minutes)      PSYCH:     Patient education was provided regarding the potential impact of stress, anxiety, depression, and fatigue/lack of restorative sleep on cognitive effectiveness.     DEMENTIA: Results were reviewed, including impairment in aspects of learning/memory, executive functioning,  and language.     There was a thorough discussion of clinical impressions, including diagnosis of Dementia/Major Neurocognitive Disorder (as the degree of cognitive impairment interferes with Ms. Nicole's ability to perform certain IADLs independently).     I explained that etiology is unclear. Aspects of the neurocognitive profile are suggestive of frontal-subcortical dysfunction, and neuroimaging findings are suggestive of vascular contributions; yet, Ms. Amandas medical history is relatively benign (i.e., without extensive cerebrovascular risk factors). Noted language difficulties could be related (at least in part) to executive dysfunction and/or disruption of relevant language circuitry (e.g., by a vascular process); However, the combination of somewhat prominent memory difficulty (though not entirely convincing for a dense amnestic process) and below-expected language performances raise concern for the early stages of a medial-temporal neurodegenerative condition (e.g., Alzheimer’s disease), which cannot be ruled out at this time; moreover, recurrent déjà vu experiences can be associated with memory and medial temporal lobe dysfunction.    Patient education was provided regarding {Neuropsych Feedback Psychoeducation:18067}. Recommendations were discussed, including ongoing neurological workup and safety considerations. Questions were addressed.     MCI: Results were reviewed and explained; there was a thorough discussion of clinical impressions. At most, history (with reportedly intact ability to perform IADLs) and present results indicate minimal/mild cognitive impairment (G31.84), limited to weaknesses in aspects of attention and executive functioning. Etiology is unclear, and some findings may reflect longstanding difficulties (consistent with reported developmental/academic history); performance patterns on testing also suggest a degree of interference from anxiety on cognitive effectiveness (similar to  impressions from Ms. Nicole's neurological exam), as individuals who are experiencing symptoms of anxiety and stress often experience difficulty on tasks of effortful processing, though anxiety alone does not account for the degree of cognitive difficulty noted.    Unfortunately, neuropsychological measures are not particularly sensitive indicators of neurobehavioral symptoms or orbitofrontal deficits; for Ms. Nicole, there seem to have been changes (causing a degree of impairment) from a neuropsychiatric/neurobehavioral standpoint. Given her medical history and neuroimaging, the overall clinical presentation most likely reflects frontal-subcortical dysfunction associated with cerebrovascular risk factors (and years of chronic moderate-heavy alcohol use), though early stages of a cortical degenerative process involving frontal regions/circuitry (e.g., frontotemporal lobar degeneration) cannot be ruled out entirely.    {CL_MIPS_Feedback:61716}

## 2025-07-26 LAB
ATRIAL RATE: 77 BPM
P AXIS: 6 DEGREES
P OFFSET: 174 MS
P ONSET: 130 MS
PR INTERVAL: 168 MS
Q ONSET: 214 MS
QRS COUNT: 13 BEATS
QRS DURATION: 98 MS
QT INTERVAL: 376 MS
QTC CALCULATION(BAZETT): 425 MS
QTC FREDERICIA: 408 MS
R AXIS: 64 DEGREES
T AXIS: 28 DEGREES
T OFFSET: 402 MS
VENTRICULAR RATE: 77 BPM

## 2025-07-28 ENCOUNTER — ANESTHESIA EVENT (OUTPATIENT)
Dept: OPERATING ROOM | Facility: HOSPITAL | Age: 55
End: 2025-07-28
Payer: COMMERCIAL

## 2025-07-28 NOTE — ANESTHESIA PREPROCEDURE EVALUATION
Patient: Park Nicole    Procedure Information       Date/Time: 25 0940    Procedure: PARATHYROIDECTOMY (Right)    Location: [facility] OR 22 / [facility] OR    Surgeons: Edvin Blanca MD          Park Nicole is a 55 year old female with history significant for ROSALBA (on CPAP), dysphagia, h/o nephrectomy, anxiety (on bupropion, trazodone, venlafaxine), depression, tremors, HTN (on losartan and propranolol), GERD (on omeprazole), hyperlipidemia (on atorvastatin), DM2 (on metformin and ozempic), tremors, ADHD, and hyperparathyroidism, who is scheduled for Right Parathyroidectomy.     Appropriately NPO. Risks/ benefits/ alternatives of anesthetic plan discussed. All questions invited and answered. Patient agrees to proceed as planned and consents to blood transfusion as well as all necessary procedures and invasive monitors.      Relevant Problems   Neuro   (+) Carpal tunnel syndrome on left   (+) Depression   (+) AI (generalized anxiety disorder)      GI   (+) Dysphagia      /Renal   (+) Malignant neoplasm of right kidney, except renal pelvis      Endocrine   (+) Hyperthyroidism   (+) Morbid obesity (Multi)   (+) Primary hyperparathyroidism (Multi)      Hematology   (+) Anemia      Musculoskeletal   (+) Carpal tunnel syndrome on left       Clinical information reviewed:                   NPO Detail:  No data recorded     Physical Exam    Airway  Mallampati: II  TM distance: >3 FB  Neck ROM: full  Mouth openin finger widths     Cardiovascular   Rhythm: regular  Rate: normal     Dental    Pulmonary Breath sounds clear to auscultation     Abdominal            Anesthesia Plan    History of general anesthesia?: yes  History of complications of general anesthesia?: no    ASA 3     general     intravenous induction   Postoperative pain plan includes opioids.  Anesthetic plan and risks discussed with patient.  Use of blood products discussed with patient and spouse who consented to blood products.     Plan discussed with CAA.

## 2025-07-29 ENCOUNTER — HOSPITAL ENCOUNTER (OUTPATIENT)
Facility: HOSPITAL | Age: 55
Setting detail: OUTPATIENT SURGERY
Discharge: HOME | End: 2025-07-29
Attending: SURGERY | Admitting: SURGERY
Payer: COMMERCIAL

## 2025-07-29 ENCOUNTER — ANESTHESIA (OUTPATIENT)
Dept: OPERATING ROOM | Facility: HOSPITAL | Age: 55
End: 2025-07-29
Payer: COMMERCIAL

## 2025-07-29 VITALS
HEART RATE: 77 BPM | HEIGHT: 66 IN | WEIGHT: 226.19 LBS | TEMPERATURE: 97.7 F | DIASTOLIC BLOOD PRESSURE: 68 MMHG | OXYGEN SATURATION: 96 % | SYSTOLIC BLOOD PRESSURE: 112 MMHG | BODY MASS INDEX: 36.35 KG/M2 | RESPIRATION RATE: 12 BRPM

## 2025-07-29 DIAGNOSIS — E89.2 STATUS POST PARATHYROIDECTOMY (CMS/HCC): Primary | ICD-10-CM

## 2025-07-29 DIAGNOSIS — E21.0 PRIMARY HYPERPARATHYROIDISM (MULTI): ICD-10-CM

## 2025-07-29 LAB
ABO GROUP (TYPE) IN BLOOD: NORMAL
ANTIBODY SCREEN: NORMAL
GLUCOSE BLD MANUAL STRIP-MCNC: 103 MG/DL (ref 74–99)
PREGNANCY TEST URINE, POC: NEGATIVE
PTH-INTACT IO % DIF SERPL: 102.4 PG/ML (ref 18.5–88)
PTH-INTACT IO % DIF SERPL: 154.4 PG/ML (ref 18.5–88)
PTH-INTACT IO % DIF SERPL: 47.9 PG/ML (ref 18.5–88)
PTH-INTACT IO % DIF SERPL: 64.1 PG/ML (ref 18.5–88)
RH FACTOR (ANTIGEN D): NORMAL

## 2025-07-29 PROCEDURE — 0753T DGTZ GLS MCRSCP SLD LEVEL IV: CPT | Mod: TC,SUR | Performed by: SURGERY

## 2025-07-29 PROCEDURE — 36415 COLL VENOUS BLD VENIPUNCTURE: CPT | Performed by: SURGERY

## 2025-07-29 PROCEDURE — 3600000009 HC OR TIME - EACH INCREMENTAL 1 MINUTE - PROCEDURE LEVEL FOUR: Performed by: SURGERY

## 2025-07-29 PROCEDURE — 7100000009 HC PHASE TWO TIME - INITIAL BASE CHARGE: Performed by: SURGERY

## 2025-07-29 PROCEDURE — 83970 ASSAY OF PARATHORMONE: CPT | Performed by: SURGERY

## 2025-07-29 PROCEDURE — 82947 ASSAY GLUCOSE BLOOD QUANT: CPT

## 2025-07-29 PROCEDURE — A60500 PR EXPLORE PARATHYROID GLANDS: Performed by: ANESTHESIOLOGY

## 2025-07-29 PROCEDURE — A60500 PR EXPLORE PARATHYROID GLANDS: Performed by: ANESTHESIOLOGIST ASSISTANT

## 2025-07-29 PROCEDURE — 7100000002 HC RECOVERY ROOM TIME - EACH INCREMENTAL 1 MINUTE: Performed by: SURGERY

## 2025-07-29 PROCEDURE — 3600000003 HC OR TIME - INITIAL BASE CHARGE - PROCEDURE LEVEL THREE: Performed by: SURGERY

## 2025-07-29 PROCEDURE — 60500 EXPLORE PARATHYROID GLANDS: CPT | Performed by: SURGERY

## 2025-07-29 PROCEDURE — 2500000004 HC RX 250 GENERAL PHARMACY W/ HCPCS (ALT 636 FOR OP/ED): Performed by: SURGERY

## 2025-07-29 PROCEDURE — 2500000002 HC RX 250 W HCPCS SELF ADMINISTERED DRUGS (ALT 637 FOR MEDICARE OP, ALT 636 FOR OP/ED): Performed by: ANESTHESIOLOGIST ASSISTANT

## 2025-07-29 PROCEDURE — 81025 URINE PREGNANCY TEST: CPT | Performed by: SURGERY

## 2025-07-29 PROCEDURE — 3700000001 HC GENERAL ANESTHESIA TIME - INITIAL BASE CHARGE: Performed by: SURGERY

## 2025-07-29 PROCEDURE — 3600000004 HC OR TIME - INITIAL BASE CHARGE - PROCEDURE LEVEL FOUR: Performed by: SURGERY

## 2025-07-29 PROCEDURE — 3700000002 HC GENERAL ANESTHESIA TIME - EACH INCREMENTAL 1 MINUTE: Performed by: SURGERY

## 2025-07-29 PROCEDURE — 3600000008 HC OR TIME - EACH INCREMENTAL 1 MINUTE - PROCEDURE LEVEL THREE: Performed by: SURGERY

## 2025-07-29 PROCEDURE — 7100000001 HC RECOVERY ROOM TIME - INITIAL BASE CHARGE: Performed by: SURGERY

## 2025-07-29 PROCEDURE — 86850 RBC ANTIBODY SCREEN: CPT | Performed by: SURGERY

## 2025-07-29 PROCEDURE — 2500000004 HC RX 250 GENERAL PHARMACY W/ HCPCS (ALT 636 FOR OP/ED): Performed by: ANESTHESIOLOGIST ASSISTANT

## 2025-07-29 PROCEDURE — 7100000010 HC PHASE TWO TIME - EACH INCREMENTAL 1 MINUTE: Performed by: SURGERY

## 2025-07-29 PROCEDURE — 2720000007 HC OR 272 NO HCPCS: Performed by: SURGERY

## 2025-07-29 RX ORDER — APREPITANT 40 MG/1
CAPSULE ORAL AS NEEDED
Status: DISCONTINUED | OUTPATIENT
Start: 2025-07-29 | End: 2025-07-29

## 2025-07-29 RX ORDER — FAMOTIDINE 10 MG/ML
INJECTION INTRAVENOUS AS NEEDED
Status: DISCONTINUED | OUTPATIENT
Start: 2025-07-29 | End: 2025-07-29

## 2025-07-29 RX ORDER — OXYCODONE HYDROCHLORIDE 5 MG/1
5 TABLET ORAL EVERY 4 HOURS PRN
Status: DISCONTINUED | OUTPATIENT
Start: 2025-07-29 | End: 2025-07-29 | Stop reason: HOSPADM

## 2025-07-29 RX ORDER — BUPIVACAINE HYDROCHLORIDE 5 MG/ML
INJECTION, SOLUTION PERINEURAL AS NEEDED
Status: DISCONTINUED | OUTPATIENT
Start: 2025-07-29 | End: 2025-07-29 | Stop reason: HOSPADM

## 2025-07-29 RX ORDER — HYDROMORPHONE HYDROCHLORIDE 1 MG/ML
INJECTION, SOLUTION INTRAMUSCULAR; INTRAVENOUS; SUBCUTANEOUS AS NEEDED
Status: DISCONTINUED | OUTPATIENT
Start: 2025-07-29 | End: 2025-07-29

## 2025-07-29 RX ORDER — FENTANYL CITRATE 50 UG/ML
INJECTION, SOLUTION INTRAMUSCULAR; INTRAVENOUS AS NEEDED
Status: DISCONTINUED | OUTPATIENT
Start: 2025-07-29 | End: 2025-07-29

## 2025-07-29 RX ORDER — OXYCODONE HYDROCHLORIDE 5 MG/1
10 TABLET ORAL EVERY 4 HOURS PRN
Status: DISCONTINUED | OUTPATIENT
Start: 2025-07-29 | End: 2025-07-29 | Stop reason: HOSPADM

## 2025-07-29 RX ORDER — HYDRALAZINE HYDROCHLORIDE 20 MG/ML
5 INJECTION INTRAMUSCULAR; INTRAVENOUS ONCE AS NEEDED
Status: DISCONTINUED | OUTPATIENT
Start: 2025-07-29 | End: 2025-07-29 | Stop reason: HOSPADM

## 2025-07-29 RX ORDER — FENTANYL CITRATE 50 UG/ML
25 INJECTION, SOLUTION INTRAMUSCULAR; INTRAVENOUS EVERY 5 MIN PRN
Status: DISCONTINUED | OUTPATIENT
Start: 2025-07-29 | End: 2025-07-29 | Stop reason: HOSPADM

## 2025-07-29 RX ORDER — PROCHLORPERAZINE EDISYLATE 5 MG/ML
5 INJECTION INTRAMUSCULAR; INTRAVENOUS ONCE AS NEEDED
Status: DISCONTINUED | OUTPATIENT
Start: 2025-07-29 | End: 2025-07-29 | Stop reason: HOSPADM

## 2025-07-29 RX ORDER — PHENYLEPHRINE HCL IN 0.9% NACL 0.4MG/10ML
SYRINGE (ML) INTRAVENOUS AS NEEDED
Status: DISCONTINUED | OUTPATIENT
Start: 2025-07-29 | End: 2025-07-29

## 2025-07-29 RX ORDER — LABETALOL HYDROCHLORIDE 5 MG/ML
5 INJECTION, SOLUTION INTRAVENOUS EVERY 10 MIN PRN
Status: DISCONTINUED | OUTPATIENT
Start: 2025-07-29 | End: 2025-07-29 | Stop reason: HOSPADM

## 2025-07-29 RX ORDER — FENTANYL CITRATE 50 UG/ML
12.5 INJECTION, SOLUTION INTRAMUSCULAR; INTRAVENOUS EVERY 5 MIN PRN
Status: DISCONTINUED | OUTPATIENT
Start: 2025-07-29 | End: 2025-07-29 | Stop reason: HOSPADM

## 2025-07-29 RX ORDER — LIDOCAINE HYDROCHLORIDE 20 MG/ML
INJECTION, SOLUTION INFILTRATION; PERINEURAL AS NEEDED
Status: DISCONTINUED | OUTPATIENT
Start: 2025-07-29 | End: 2025-07-29

## 2025-07-29 RX ORDER — MIDAZOLAM HYDROCHLORIDE 2 MG/2ML
INJECTION, SOLUTION INTRAMUSCULAR; INTRAVENOUS AS NEEDED
Status: DISCONTINUED | OUTPATIENT
Start: 2025-07-29 | End: 2025-07-29

## 2025-07-29 RX ORDER — OXYCODONE AND ACETAMINOPHEN 5; 325 MG/1; MG/1
1 TABLET ORAL EVERY 6 HOURS PRN
Qty: 10 TABLET | Refills: 0 | Status: SHIPPED | OUTPATIENT
Start: 2025-07-29 | End: 2025-08-08

## 2025-07-29 RX ORDER — DIPHENHYDRAMINE HYDROCHLORIDE 50 MG/ML
INJECTION, SOLUTION INTRAMUSCULAR; INTRAVENOUS AS NEEDED
Status: DISCONTINUED | OUTPATIENT
Start: 2025-07-29 | End: 2025-07-29

## 2025-07-29 RX ORDER — ALBUTEROL SULFATE 0.83 MG/ML
2.5 SOLUTION RESPIRATORY (INHALATION) ONCE AS NEEDED
Status: DISCONTINUED | OUTPATIENT
Start: 2025-07-29 | End: 2025-07-29 | Stop reason: HOSPADM

## 2025-07-29 RX ORDER — FENTANYL CITRATE 50 UG/ML
50 INJECTION, SOLUTION INTRAMUSCULAR; INTRAVENOUS EVERY 5 MIN PRN
Status: DISCONTINUED | OUTPATIENT
Start: 2025-07-29 | End: 2025-07-29 | Stop reason: HOSPADM

## 2025-07-29 RX ORDER — ROCURONIUM BROMIDE 10 MG/ML
INJECTION, SOLUTION INTRAVENOUS AS NEEDED
Status: DISCONTINUED | OUTPATIENT
Start: 2025-07-29 | End: 2025-07-29

## 2025-07-29 RX ORDER — ACETAMINOPHEN 10 MG/ML
INJECTION, SOLUTION INTRAVENOUS AS NEEDED
Status: DISCONTINUED | OUTPATIENT
Start: 2025-07-29 | End: 2025-07-29

## 2025-07-29 RX ORDER — PROPOFOL 10 MG/ML
INJECTION, EMULSION INTRAVENOUS AS NEEDED
Status: DISCONTINUED | OUTPATIENT
Start: 2025-07-29 | End: 2025-07-29

## 2025-07-29 RX ORDER — SODIUM CHLORIDE, SODIUM LACTATE, POTASSIUM CHLORIDE, CALCIUM CHLORIDE 600; 310; 30; 20 MG/100ML; MG/100ML; MG/100ML; MG/100ML
100 INJECTION, SOLUTION INTRAVENOUS CONTINUOUS
Status: DISCONTINUED | OUTPATIENT
Start: 2025-07-29 | End: 2025-07-29 | Stop reason: HOSPADM

## 2025-07-29 RX ORDER — LIDOCAINE HYDROCHLORIDE 10 MG/ML
0.1 INJECTION, SOLUTION INFILTRATION; PERINEURAL ONCE
Status: DISCONTINUED | OUTPATIENT
Start: 2025-07-29 | End: 2025-07-29 | Stop reason: HOSPADM

## 2025-07-29 RX ADMIN — DIPHENHYDRAMINE HYDROCHLORIDE 25 MG: 50 INJECTION INTRAMUSCULAR; INTRAVENOUS at 10:34

## 2025-07-29 RX ADMIN — SUGAMMADEX 200 MG: 100 INJECTION, SOLUTION INTRAVENOUS at 12:34

## 2025-07-29 RX ADMIN — PROPOFOL 150 MG: 10 INJECTION, EMULSION INTRAVENOUS at 10:33

## 2025-07-29 RX ADMIN — ACETAMINOPHEN 1000 MG: 10 INJECTION, SOLUTION INTRAVENOUS at 11:58

## 2025-07-29 RX ADMIN — LIDOCAINE HYDROCHLORIDE 100 MG: 20 INJECTION, SOLUTION INFILTRATION; PERINEURAL at 10:33

## 2025-07-29 RX ADMIN — FAMOTIDINE 10 MG: 10 INJECTION, SOLUTION INTRAVENOUS at 12:09

## 2025-07-29 RX ADMIN — DEXAMETHASONE SODIUM PHOSPHATE 4 MG: 4 INJECTION INTRA-ARTICULAR; INTRALESIONAL; INTRAMUSCULAR; INTRAVENOUS; SOFT TISSUE at 10:33

## 2025-07-29 RX ADMIN — FENTANYL CITRATE 100 MCG: 50 INJECTION, SOLUTION INTRAMUSCULAR; INTRAVENOUS at 10:33

## 2025-07-29 RX ADMIN — ROCURONIUM BROMIDE 50 MG: 10 INJECTION INTRAVENOUS at 10:33

## 2025-07-29 RX ADMIN — MIDAZOLAM HYDROCHLORIDE 2 MG: 2 INJECTION, SOLUTION INTRAMUSCULAR; INTRAVENOUS at 10:26

## 2025-07-29 RX ADMIN — SODIUM CHLORIDE, SODIUM LACTATE, POTASSIUM CHLORIDE, AND CALCIUM CHLORIDE: 600; 310; 30; 20 INJECTION, SOLUTION INTRAVENOUS at 10:15

## 2025-07-29 RX ADMIN — HYDROMORPHONE HYDROCHLORIDE 0.2 MG: 1 INJECTION, SOLUTION INTRAMUSCULAR; INTRAVENOUS; SUBCUTANEOUS at 11:33

## 2025-07-29 RX ADMIN — HYDROMORPHONE HYDROCHLORIDE 0.2 MG: 1 INJECTION, SOLUTION INTRAMUSCULAR; INTRAVENOUS; SUBCUTANEOUS at 10:33

## 2025-07-29 RX ADMIN — APREPITANT 40 MG: 40 CAPSULE ORAL at 10:00

## 2025-07-29 RX ADMIN — Medication 80 MCG: at 10:34

## 2025-07-29 ASSESSMENT — PAIN SCALES - GENERAL
PAINLEVEL_OUTOF10: 0 - NO PAIN

## 2025-07-29 ASSESSMENT — PAIN - FUNCTIONAL ASSESSMENT
PAIN_FUNCTIONAL_ASSESSMENT: 0-10

## 2025-07-29 ASSESSMENT — COLUMBIA-SUICIDE SEVERITY RATING SCALE - C-SSRS
6. HAVE YOU EVER DONE ANYTHING, STARTED TO DO ANYTHING, OR PREPARED TO DO ANYTHING TO END YOUR LIFE?: NO
2. HAVE YOU ACTUALLY HAD ANY THOUGHTS OF KILLING YOURSELF?: NO
1. IN THE PAST MONTH, HAVE YOU WISHED YOU WERE DEAD OR WISHED YOU COULD GO TO SLEEP AND NOT WAKE UP?: NO

## 2025-07-29 NOTE — ANESTHESIA PROCEDURE NOTES
Airway  Date/Time: 7/29/2025 10:40 AM  Reason: elective    Airway not difficult    Staffing  Performed: ESTER   Authorized by: Dea Reese MD    Performed by: LAMINE Mabry  Patient location during procedure: OR    Patient Condition  Indications for airway management: anesthesia  Patient position: sniffing  Sedation level: deep     Final Airway Details   Preoxygenated: yes  Final airway type: endotracheal airway  Successful airway: ETT  Cuffed: yes   Successful intubation technique: video laryngoscopy  Adjuncts used in placement: intubating stylet  Endotracheal tube insertion site: oral  Blade: Wilfred  Blade size: #4  ETT size (mm): 7.0  Cormack-Lehane Classification: grade I - full view of glottis  Placement verified by: chest auscultation and capnometry   Measured from: gums  ETT to gums (cm): 21  Number of attempts at approach: 1

## 2025-07-29 NOTE — DISCHARGE SUMMARY
Discharge Diagnosis  Primary hyperparathyroidism (Multi)    Issues Requiring Follow-Up  Primary hyperparathyroidism status post parathyroidectomy     Test Results Pending At Discharge  Pending Labs       Order Current Status    Surgical Pathology Exam Collected (07/29/25 1151)            Hospital Course  Park Nicole is a 55F with past medical history of primary hyperparathyroidism secondary to a symptomatic parathyroid adenoma. She was admitted for parathyroidectomy with intraoperative parathyroid hormone monitoring. She had no complications intraoperatively. Postoperatively, she recovered well without complications and was discharged home on the same day of surgery.     Visit Vitals  /68   Pulse 77   Temp 36.5 °C (97.7 °F)   Resp 12     Vitals:    07/29/25 0922   Weight: 103 kg (226 lb 3.1 oz)       Immunization History   Administered Date(s) Administered    COVID-19, mRNA, LNP-S, PF, 30 mcg/0.3 mL dose 10/07/2021    Flu vaccine (IIV4), preservative free *Check age/dose* 11/20/2017, 12/12/2022    Flu vaccine, trivalent, preservative free, age 6 months and greater (Fluarix/Fluzone/Flulaval) 12/02/2010, 11/19/2012    Influenza, injectable, quadrivalent, preservative free, pediatric 12/08/2014, 11/23/2015    Influenza, seasonal, injectable 02/09/2012, 11/14/2013, 02/06/2020, 10/01/2020, 11/18/2020, 11/10/2021    Moderna COVID-19 vaccine, 12 years and older (50mcg/0.5mL)(Spikevax) 12/23/2024    Pfizer COVID-19 vaccine, 12 years and older, (30mcg/0.3mL) (Comirnaty) 12/18/2023    Pfizer COVID-19 vaccine, bivalent, age 12 years and older (30 mcg/0.3 mL) 12/12/2022    Pfizer Purple Cap SARS-CoV-2 02/02/2021, 02/26/2021    Tdap vaccine, age 7 year and older (BOOSTRIX, ADACEL) 02/19/2009, 07/12/2020    Varicella vaccine, subcutaneous (VARIVAX) 12/29/2006, 01/16/2007       Results  Results for orders placed or performed during the hospital encounter of 07/29/25 (from the past 24 hours)   POCT GLUCOSE   Result Value  Ref Range    POCT Glucose 103 (H) 74 - 99 mg/dL   Intraoperative PTH   Result Value Ref Range    Intraoperative .4 (H) 18.5 - 88.0 pg/mL   Type And Screen   Result Value Ref Range    ABO TYPE A     Rh TYPE POS     ANTIBODY SCREEN NEG    POCT pregnancy, urine   Result Value Ref Range    Preg Test, Ur Negative Negative   Intraoperative PTH   Result Value Ref Range    Intraoperative .4 (H) 18.5 - 88.0 pg/mL   Intraoperative PTH   Result Value Ref Range    Intraoperative PTH 64.1 18.5 - 88.0 pg/mL   Intraoperative PTH   Result Value Ref Range    Intraoperative PTH 47.9 18.5 - 88.0 pg/mL        Pertinent Physical Exam From Post Anesthesia Case Unit     Physical Exam:   Constitutional: well appearing, resting comfortably  Eyes: EOMI  Head/Neck: Incision site covered with steri strips and no surrounding erythema, fluctuance, or purulence   Respiratory: nonlabored breathing on room air  Cardiovascular: regular rate  Abdominal: soft, nontender, nondistended, no rebound or guarding  MSK: moves all extremities  Extremities: no lower extremity edema  Skin: warm and well perfused, no rashes  Psych: appropriate mood and behavior       Home Medications     Medication List      START taking these medications     oxyCODONE-acetaminophen 5-325 mg tablet; Commonly known as: Percocet;   Take 1 tablet by mouth every 6 hours if needed for severe pain (7 - 10)   for up to 10 days.     CONTINUE taking these medications     atorvastatin 20 mg tablet; Commonly known as: Lipitor   buPROPion  mg 24 hr tablet; Commonly known as: Wellbutrin XL   busPIRone 10 mg tablet; Commonly known as: Buspar   Cozaar 50 mg tablet; Generic drug: losartan   ferrous sulfate 325 mg (65 mg elemental) tablet   gabapentin 300 mg capsule; Commonly known as: Neurontin   hydrOXYzine HCL 25 mg tablet; Commonly known as: Atarax   metFORMIN 500 mg tablet; Commonly known as: Glucophage   omeprazole 40 mg DR capsule; Commonly known as: PriLOSEC   Ozempic  1 mg/dose (4 mg/3 mL) pen injector; Generic drug: semaglutide   propranolol 20 mg tablet; Commonly known as: Inderal   traZODone 50 mg tablet; Commonly known as: Desyrel   venlafaxine  mg 24 hr capsule; Commonly known as: Effexor-XR       Outpatient Follow-Up  Future Appointments   Date Time Provider Department Center   8/14/2025  1:45 PM Edvin Blanca MD [id number] [facility]   11/20/2025  5:00 PM PAR OPCTR MAMMO 2 PAROPCMAM PAR RAD   6/2/2026 11:00 AM Staci Tavares MD [id number] [place]       Armando Escoto MD

## 2025-07-29 NOTE — BRIEF OP NOTE
Date: 2025  OR Location: [facility] OR    Name: Park Nicole, : 1970, Age: 55 y.o., MRN: [id number], Sex: female    Diagnosis  Pre-op Diagnosis      * Primary hyperparathyroidism (Multi) [E21.0] Post-op Diagnosis     * Primary hyperparathyroidism (Multi) [E21.0]     Procedures  PARATHYROIDECTOMY  10741 - NM PARATHYROIDECTOMY/EXPLORATION PARATHYROIDS      Surgeons      * Edvin Blanca - Primary    Resident/Fellow/Other Assistant:  Surgeons and Role:     * Armando Escoto MD - Resident - Assisting    Staff:   Circulator: Camden Benitez Person: Razia Benitez Person: Erwin    Anesthesia Staff: Anesthesiologist: Dea Reese MD  C-AA: LAMINE Mabry  Frontline Breaker: LAMINE Funk    Procedure Summary  Anesthesia: General  ASA: III  Estimated Blood Loss: 3 mL  Intra-op Medications:   Administrations occurring from 0940 to 1230 on 25:   Medication Name Total Dose   BUPivacaine HCl (Marcaine) 0.5 % (5 mg/mL) injection 8 mL   acetaminophen (Ofirmev) injection 1,000 mg   aprepitant (Emend) 40 mg capsule 40 mg   dexAMETHasone (Decadron) 4 mg/mL IV Syringe 2 mL 4 mg   diphenhydrAMINE (BENADryl) injection 25 mg   famotidine (Pepcid) 10 mg/mL 10 mg   fentaNYL (Sublimaze) injection 50 mcg/mL 100 mcg   HYDROmorphone (Dilaudid) injection 1 mg/mL 0.4 mg   LR bolus Cannot be calculated   lidocaine (Xylocaine) injection 2 % 100 mg   midazolam PF (Versed) injection 1 mg/mL 2 mg   phenylephrine 40 mcg/mL syringe 10 mL 80 mcg   propofol (Diprivan) injection 10 mg/mL 150 mg   rocuronium (ZeMuron) 50 mg/5 mL injection 50 mg              Anesthesia Record               Intraprocedure I/O Totals          Intake    LR bolus 1000.00 mL    Total Intake 1000 mL       Output    Est. Blood Loss 3 mL    Total Output 3 mL       Net    Net Volume 997 mL          Specimen:   ID Type Source Tests Collected by Time   1 : RIGHT INFERIOR PARATHYROID Tissue PARATHYROID GLAND RESECTION RIGHT INFERIOR SURGICAL  PATHOLOGY EXAM Edvin Blanca MD 7/29/2025 1151   A : PTH #2 Blood Blood, Venous INTRAOPERATIVE PTH Edvin Blanca MD 7/29/2025 1122   B : PTH #3 Blood Blood, Venous INTRAOPERATIVE PTH Edvin Blanca MD 7/29/2025 1129   C : PTH #4 Blood Blood, Venous INTRAOPERATIVE PTH Edvin Blanca MD 7/29/2025 1142                  Findings: Right inferior parathyroid adenoma    Complications:  None; patient tolerated the procedure well.     Disposition: PACU - hemodynamically stable.  Condition: stable  Specimens Collected:   ID Type Source Tests Collected by Time   1 : RIGHT INFERIOR PARATHYROID Tissue PARATHYROID GLAND RESECTION RIGHT INFERIOR SURGICAL PATHOLOGY EXAM Edvin Blanca MD 7/29/2025 1151   A : PTH #2 Blood Blood, Venous INTRAOPERATIVE PTH Edvin Blanca MD 7/29/2025 1122   B : PTH #3 Blood Blood, Venous INTRAOPERATIVE PTH Edvin Blanca MD 7/29/2025 1129   C : PTH #4 Blood Blood, Venous INTRAOPERATIVE PTH Edvin Blanca MD 7/29/2025 1142     Attending Attestation:     Edvin Blanca  Phone Number: [phone]

## 2025-07-29 NOTE — OP NOTE
PARATHYROIDECTOMY (R) Operative Note     Date: 2025  OR Location: [facility] OR    Name: Park Nicole, : 1970, Age: 55 y.o., MRN: [id number], Sex: female    Diagnosis  Pre-op Diagnosis      * Primary hyperparathyroidism (Multi) [E21.0] Post-op Diagnosis     * Primary hyperparathyroidism (Multi) [E21.0]     Procedures  PARATHYROIDECTOMY  98613 - OR PARATHYROIDECTOMY/EXPLORATION PARATHYROIDS      Surgeons      * Edvin Blanca - Primary    Resident/Fellow/Other Assistant:  Surgeons and Role:     * Armando Escoto MD - Resident - Assisting    Staff:   Circulator: Camden Benitez Person: Razia Benitez Person: Erwin    Anesthesia Staff: Anesthesiologist: Dea Reese MD  C-AA: LAMINE Mabry  Frontline Breaker: LAMINE Funk    Procedure Summary  Anesthesia: General  ASA: III  Estimated Blood Loss: 3mL  Intra-op Medications:   Administrations occurring from 0940 to 1230 on 25:   Medication Name Total Dose   BUPivacaine HCl (Marcaine) 0.5 % (5 mg/mL) injection 8 mL   acetaminophen (Ofirmev) injection 1,000 mg   aprepitant (Emend) 40 mg capsule 40 mg   dexAMETHasone (Decadron) 4 mg/mL IV Syringe 2 mL 4 mg   diphenhydrAMINE (BENADryl) injection 25 mg   famotidine (Pepcid) 10 mg/mL 10 mg   fentaNYL (Sublimaze) injection 50 mcg/mL 100 mcg   HYDROmorphone (Dilaudid) injection 1 mg/mL 0.4 mg   LR bolus Cannot be calculated   lidocaine (Xylocaine) injection 2 % 100 mg   midazolam PF (Versed) injection 1 mg/mL 2 mg   phenylephrine 40 mcg/mL syringe 10 mL 80 mcg   propofol (Diprivan) injection 10 mg/mL 150 mg   rocuronium (ZeMuron) 50 mg/5 mL injection 50 mg              Anesthesia Record               Intraprocedure I/O Totals          Intake    LR bolus 1000.00 mL    Total Intake 1000 mL       Output    Est. Blood Loss 3 mL    Total Output 3 mL       Net    Net Volume 997 mL          Specimen:   ID Type Source Tests Collected by Time   1 : RIGHT INFERIOR PARATHYROID Tissue PARATHYROID  GLAND RESECTION RIGHT INFERIOR SURGICAL PATHOLOGY EXAM Edvin Blanca MD 7/29/2025 1151   A : PTH #2 Blood Blood, Venous INTRAOPERATIVE PTH Edvin Blanca MD 7/29/2025 1122   B : PTH #3 Blood Blood, Venous INTRAOPERATIVE PTH Edvin Blanca MD 7/29/2025 1129   C : PTH #4 Blood Blood, Venous INTRAOPERATIVE PTH Edvin Blanca MD 7/29/2025 1142                 Drains and/or Catheters: * None in log *    Tourniquet Times:         Implants:     Findings: Right inferior parathyroid adenoma approximately 350 mg.  With removal patient had normalization of PTH levels.    Indications: Park Nicole is an 55 y.o. female who is having surgery for Primary hyperparathyroidism (Multi) [E21.0].  Patient has primary hyperparathyroidism.  Her calcium is high at 11.3.  Parathyroid hormone level high at 113.  She has a sestamibi scan and ultrasound both showing a right sided parathyroid adenoma.  Risk benefits of surgery discussed she agreed and signed consent.    The patient was seen in the preoperative area. The risks, benefits, complications, treatment options, non-operative alternatives, expected recovery and outcomes were discussed with the patient. The possibilities of reaction to medication, pulmonary aspiration, injury to surrounding structures, bleeding, recurrent infection, the need for additional procedures, failure to diagnose a condition, and creating a complication requiring transfusion or operation were discussed with the patient. The patient concurred with the proposed plan, giving informed consent.  The site of surgery was properly noted/marked if necessary per policy. The patient has been actively warmed in preoperative area. Preoperative antibiotics are not indicated. Venous thrombosis prophylaxis have been ordered including bilateral sequential compression devices    Procedure Details: On the operative date patient's brought to the operating room after induction anesthetic she was prepped and draped in the  usual sterile fashion with a towel roll behind the shoulders and the neck gently extended.  Her baseline parathyroid hormone level from the preoperative area was 102.4.  We made a 4 cm cervical collar incision and divided down through the platysma with Bovie electrocautery.  We raised limited subplatysmal flaps in all directions and  the strap muscles in the midline with Bovie electrocautery.  Next we retracted the strap muscles out laterally to level the carotid artery.    I rolled the thyroid gland and medially with a Kitner and we identified a right inferior parathyroid adenoma sitting along the lower third of the gland.  We dissected circumferentially around the gland.  Prior to dividing its vascular blood supply we sent a stimulated PTH level from the right internal jugular vein that went up to 154.4.  We then ligated the blood supply of the gland with a 3-0 silk tie.  This was done anteromedial to the right recurrent laryngeal nerve which was visualized.  Ligature was not used in that area.  We removed an approximately 350 mg parathyroid adenoma.    We then jose 5 and 10-minute post excision values PTH which dropped to 64.1 and 47.9 respectively demonstrating good evidence of cure.  We irrigated the neck out well and achieved hemostasis.  Recurrent nerve was intact along its course.  Per clot powder was used for final hemostasis.  We then closed strap muscles and platysma with 3-0 Vicryl skin with 4 Monocryl.  Half percent Marcaine was used local anesthetic and dry sterile dressings were applied.  Evidence of Infection: No   Complications:  None; patient tolerated the procedure well.    Disposition: PACU - hemodynamically stable.  Condition: stable                 Additional Details:     Attending Attestation: I was present and scrubbed for the entire procedure.    Edvin Blanca  Phone Number: [phone]

## 2025-07-29 NOTE — ANESTHESIA POSTPROCEDURE EVALUATION
Patient: Park Nicole    Procedure Summary       Date: 07/29/25 Room / Location: [facility] OR 22 / [facility]    Anesthesia Start: 1025 Anesthesia Stop: 1253    Procedure: PARATHYROIDECTOMY (Right) Diagnosis:       Primary hyperparathyroidism (Multi)      (Primary hyperparathyroidism (Multi) [E21.0])    Surgeons: Edvin Blanca MD Responsible Provider: Dea Reese MD    Anesthesia Type: general ASA Status: 3            Anesthesia Type: general    Vitals Value Taken Time   /75 07/29/25 12:54   Temp 36.2 07/29/25 12:54   Pulse 87 07/29/25 12:54   Resp 10 07/29/25 12:54   SpO2 96 07/29/25 12:54       Anesthesia Post Evaluation    Patient location during evaluation: bedside  Patient participation: complete - patient cannot participate  Level of consciousness: awake  Pain management: adequate  Airway patency: patent  Cardiovascular status: acceptable  Respiratory status: acceptable  Hydration status: acceptable  Postoperative Nausea and Vomiting: none        No notable events documented.

## 2025-07-29 NOTE — DISCHARGE INSTRUCTIONS
You have steri-strips (small paper tape) along your incision. You can shower, pat dry; do not soak in a tub.  The steri-strips will fall off on their own; do not peel them off. You have surgical glue over your incision. You may shower and use soap and water over your incision. Pat dry. The surgical glue with slowly dissolve and fall away.

## 2025-08-05 LAB
LABORATORY COMMENT REPORT: NORMAL
PATH REPORT.FINAL DX SPEC: NORMAL
PATH REPORT.GROSS SPEC: NORMAL
PATH REPORT.RELEVANT HX SPEC: NORMAL
PATH REPORT.TOTAL CANCER: NORMAL
RESIDENT REVIEW: NORMAL

## 2025-08-14 ENCOUNTER — OFFICE VISIT (OUTPATIENT)
Dept: SURGERY | Facility: CLINIC | Age: 55
End: 2025-08-14
Payer: COMMERCIAL

## 2025-08-14 VITALS
BODY MASS INDEX: 36.8 KG/M2 | SYSTOLIC BLOOD PRESSURE: 133 MMHG | HEART RATE: 67 BPM | DIASTOLIC BLOOD PRESSURE: 85 MMHG | WEIGHT: 228 LBS

## 2025-08-14 DIAGNOSIS — E89.2 S/P PARATHYROIDECTOMY (CMS/HCC): ICD-10-CM

## 2025-08-14 PROCEDURE — 99211 OFF/OP EST MAY X REQ PHY/QHP: CPT | Performed by: SURGERY

## 2025-08-14 PROCEDURE — 1036F TOBACCO NON-USER: CPT | Performed by: SURGERY

## 2025-08-24 LAB
25(OH)D3+25(OH)D2 SERPL-MCNC: 50 NG/ML (ref 30–100)
CALCIUM SERPL-MCNC: 9.8 MG/DL (ref 8.6–10.4)
PTH-INTACT SERPL-MCNC: NORMAL PG/ML

## 2025-08-25 ENCOUNTER — TELEPHONE (OUTPATIENT)
Dept: SURGERY | Facility: CLINIC | Age: 55
End: 2025-08-25
Payer: COMMERCIAL

## 2025-08-25 LAB
25(OH)D3+25(OH)D2 SERPL-MCNC: 50 NG/ML (ref 30–100)
CALCIUM SERPL-MCNC: 9.8 MG/DL (ref 8.6–10.4)
PTH-INTACT SERPL-MCNC: 62 PG/ML (ref 16–77)

## 2025-11-20 ENCOUNTER — APPOINTMENT (OUTPATIENT)
Dept: RADIOLOGY | Facility: CLINIC | Age: 55
End: 2025-11-20
Payer: COMMERCIAL

## (undated) DEVICE — MANIFOLD, 4 PORT NEPTUNE STANDARD

## (undated) DEVICE — SUTURE, PROLENE, 5-0, 36 IN, C-1, CV-11, BLUE

## (undated) DEVICE — STRIP, SKIN CLOSURE, STERI STRIP, REINFORCED, 0.5 X 4 IN

## (undated) DEVICE — SPONGE, DISSECTOR, PEANUT, 3/8, STERILE 5 FOAM HOLDER"

## (undated) DEVICE — ADHESIVE, SKIN, MASTISOL, 2/3 CC VIAL

## (undated) DEVICE — Device

## (undated) DEVICE — DRAPE, SHEET, THYROID, W/ARMBOARD COVER, 100 X 121 IN, DISPOSABLE, LF, STERILE

## (undated) DEVICE — GLOVE, SURGICAL, PROTEXIS NEOPRENE, 7.5, PF, LF

## (undated) DEVICE — DRAPE, MAGENTIC INSTRUMENT, 12X16

## (undated) DEVICE — DRAPE, INSTRUMENT, W/POUCH, STERI DRAPE, 7 X 11 IN, DISPOSABLE, STERILE

## (undated) DEVICE — SUTURE, SILK, 2-0, 18 IN, BLACK

## (undated) DEVICE — SUTURE, MONOCRYLIC, 4-0, P3, MONO 18

## (undated) DEVICE — DRESSING, NON-ADHERENT, TELFA, OUCHLESS, 3 X 8 IN, STERILE

## (undated) DEVICE — DRESSING, ADHESIVE, ISLAND, TELFA, 2 X 3.75 IN, LF

## (undated) DEVICE — CLIP, LIGATING, W/ADHESIVE PAD, MEDIUM, TITANIUM

## (undated) DEVICE — TOWEL, SURGICAL, NEURO, O/R, 16 X 26, BLUE, STERILE

## (undated) DEVICE — NEEDLE, HYPODERMIC, 23 GA X 1.5 IN

## (undated) DEVICE — COUNTER, NEEDLE, FOAM BLOCK, W/MAGNET, W/BLADE GUARD, 10 COUNT, RED, LF

## (undated) DEVICE — COVER, CART, 45 X 27 X 48 IN, CLEAR

## (undated) DEVICE — SUTURE, SILK, 3-0, 18 IN, MULTIPACK, BLACK

## (undated) DEVICE — ELECTRODE, ELECTROSURGICAL, BLADE, INSULATED, ENT/IMA, STERILE

## (undated) DEVICE — CLIP, LIGATING, W/ADHESIVE, WIDE SLOT, SMALL, TITANIUM

## (undated) DEVICE — SUTURE, VICRYL, 3-0, 27 IN, SH

## (undated) DEVICE — APPLICATOR, PREP, CHLORAPREP, W/ORANGE TINT, 10.5ML